# Patient Record
Sex: MALE | URBAN - METROPOLITAN AREA
[De-identification: names, ages, dates, MRNs, and addresses within clinical notes are randomized per-mention and may not be internally consistent; named-entity substitution may affect disease eponyms.]

---

## 2017-02-09 ENCOUNTER — APPOINTMENT (OUTPATIENT)
Age: 72
Setting detail: DERMATOLOGY
End: 2017-02-10

## 2017-02-09 DIAGNOSIS — L57.8 OTHER SKIN CHANGES DUE TO CHRONIC EXPOSURE TO NONIONIZING RADIATION: ICD-10-CM

## 2017-02-09 DIAGNOSIS — B37.2 CANDIDIASIS OF SKIN AND NAIL: ICD-10-CM

## 2017-02-09 DIAGNOSIS — Z85.828 PERSONAL HISTORY OF OTHER MALIGNANT NEOPLASM OF SKIN: ICD-10-CM

## 2017-02-09 DIAGNOSIS — L82.1 OTHER SEBORRHEIC KERATOSIS: ICD-10-CM

## 2017-02-09 DIAGNOSIS — D22 MELANOCYTIC NEVI: ICD-10-CM

## 2017-02-09 DIAGNOSIS — Z71.89 OTHER SPECIFIED COUNSELING: ICD-10-CM

## 2017-02-09 DIAGNOSIS — L57.0 ACTINIC KERATOSIS: ICD-10-CM

## 2017-02-09 PROBLEM — D22.5 MELANOCYTIC NEVI OF TRUNK: Status: ACTIVE | Noted: 2017-02-09

## 2017-02-09 PROBLEM — D22.4 MELANOCYTIC NEVI OF SCALP AND NECK: Status: ACTIVE | Noted: 2017-02-09

## 2017-02-09 PROCEDURE — 17003 DESTRUCT PREMALG LES 2-14: CPT

## 2017-02-09 PROCEDURE — OTHER REASSURANCE: OTHER

## 2017-02-09 PROCEDURE — 17000 DESTRUCT PREMALG LESION: CPT

## 2017-02-09 PROCEDURE — OTHER OBSERVATION: OTHER

## 2017-02-09 PROCEDURE — OTHER LIQUID NITROGEN: OTHER

## 2017-02-09 PROCEDURE — OTHER COUNSELING: OTHER

## 2017-02-09 PROCEDURE — 99213 OFFICE O/P EST LOW 20 MIN: CPT | Mod: 25

## 2017-02-09 PROCEDURE — OTHER MIPS QUALITY: OTHER

## 2017-02-09 ASSESSMENT — LOCATION SIMPLE DESCRIPTION DERM
LOCATION SIMPLE: LEFT FOREHEAD
LOCATION SIMPLE: RIGHT LIP
LOCATION SIMPLE: LEFT CHEEK
LOCATION SIMPLE: ABDOMEN
LOCATION SIMPLE: RIGHT ANTERIOR NECK
LOCATION SIMPLE: RIGHT FOREARM
LOCATION SIMPLE: RIGHT EAR
LOCATION SIMPLE: RIGHT FOREHEAD
LOCATION SIMPLE: LEFT FOREARM
LOCATION SIMPLE: LEFT LIP
LOCATION SIMPLE: NOSE

## 2017-02-09 ASSESSMENT — LOCATION DETAILED DESCRIPTION DERM
LOCATION DETAILED: LEFT DISTAL DORSAL FOREARM
LOCATION DETAILED: RIGHT INFERIOR VERMILION LIP
LOCATION DETAILED: LEFT SUPERIOR LATERAL MALAR CHEEK
LOCATION DETAILED: LEFT INFERIOR VERMILION LIP
LOCATION DETAILED: NASAL ROOT
LOCATION DETAILED: LEFT INFERIOR LATERAL FOREHEAD
LOCATION DETAILED: RIGHT DISTAL DORSAL FOREARM
LOCATION DETAILED: RIGHT LATERAL FOREHEAD
LOCATION DETAILED: EPIGASTRIC SKIN
LOCATION DETAILED: RIGHT SUPERIOR HELIX
LOCATION DETAILED: RIGHT CLAVICULAR NECK

## 2017-02-09 ASSESSMENT — LOCATION ZONE DERM
LOCATION ZONE: TRUNK
LOCATION ZONE: ARM
LOCATION ZONE: LIP
LOCATION ZONE: NECK
LOCATION ZONE: FACE
LOCATION ZONE: NOSE
LOCATION ZONE: EAR

## 2017-02-09 NOTE — PROCEDURE: MIPS QUALITY
Quality 265: Biopsy Follow-Up: Biopsy results reviewed, communicated, tracked, and documented
Quality 155: Falls Plan Of Care: Plan of Care not Documented, Reason not Otherwise Specified
Quality 110: Preventive Care And Screening: Influenza Immunization: Influenza Immunization Administered during Influenza season
Quality 154 Part A: Falls: Risk Assessment (Should Be Reported With Measure 155.): Falls risk assessment completed and documented in the past 12 months.
Quality 431: Preventive Care And Screening: Unhealthy Alcohol Use - Screening: Patient screened for unhealthy alcohol use using a single question and scores less than 2 times per year
Quality 47: Advance Care Plan: Advance care planning not documented, reason not otherwise specified.
Quality 226: Preventive Care And Screening: Tobacco Use: Screening And Cessation Intervention: Patient screened for tobacco and is an ex-smoker
Detail Level: Detailed
Quality 131: Pain Assessment And Follow-Up: Pain assessment using a standardized tool is documented as negative, no follow-up plan required
Quality 111:Pneumonia Vaccination Status For Older Adults: Pneumococcal Vaccination not Administered or Previously Received, Reason not Otherwise Specified
Quality 154 Part B: Falls: Risk Screening (Should Be Reported With Measure 155.): Patient screened for future fall risk; documentation of no falls in the past year or only one fall without injury in the past year
Quality 130: Documentation Of Current Medications In The Medical Record: Current Medications Documented

## 2017-02-09 NOTE — PROCEDURE: LIQUID NITROGEN
Detail Level: Detailed
Duration Of Freeze Thaw-Cycle (Seconds): 4
Post-Care Instructions: I reviewed with the patient in detail post-care instructions. Patient is to wear sunprotection, and avoid picking at any of the treated lesions. Pt may apply Vaseline to crusted or scabbing areas.
Number Of Freeze-Thaw Cycles: 1 freeze-thaw cycle
Consent: The patient's consent was obtained including but not limited to risks of crusting, scabbing, blistering, scarring, darker or lighter pigmentary change, recurrence, incomplete removal and infection.
Render Post-Care Instructions In Note?: yes

## 2017-08-15 ENCOUNTER — HOSPITAL ENCOUNTER (INPATIENT)
Age: 72
LOS: 3 days | Discharge: HOME | DRG: 417 | End: 2017-08-18
Attending: EMERGENCY MEDICINE | Admitting: INTERNAL MEDICINE
Payer: MEDICARE

## 2017-08-15 ENCOUNTER — APPOINTMENT (OUTPATIENT)
Dept: GENERAL RADIOLOGY | Age: 72
DRG: 417 | End: 2017-08-15
Payer: MEDICARE

## 2017-08-15 ENCOUNTER — APPOINTMENT (OUTPATIENT)
Dept: CT IMAGING | Age: 72
DRG: 417 | End: 2017-08-15
Payer: MEDICARE

## 2017-08-15 ENCOUNTER — APPOINTMENT (OUTPATIENT)
Dept: ULTRASOUND IMAGING | Age: 72
DRG: 417 | End: 2017-08-15
Payer: MEDICARE

## 2017-08-15 DIAGNOSIS — R09.02 HYPOXIA: ICD-10-CM

## 2017-08-15 DIAGNOSIS — R10.84 ABDOMINAL PAIN, GENERALIZED: ICD-10-CM

## 2017-08-15 DIAGNOSIS — R63.0 ANOREXIA: ICD-10-CM

## 2017-08-15 DIAGNOSIS — K85.90 ACUTE PANCREATITIS, UNSPECIFIED COMPLICATION STATUS, UNSPECIFIED PANCREATITIS TYPE: Primary | ICD-10-CM

## 2017-08-15 DIAGNOSIS — J44.9 CHRONIC OBSTRUCTIVE PULMONARY DISEASE, UNSPECIFIED COPD TYPE (HCC): ICD-10-CM

## 2017-08-15 PROBLEM — Z98.890 S/P AAA REPAIR: Status: ACTIVE | Noted: 2017-08-15

## 2017-08-15 PROBLEM — J44.1 COPD EXACERBATION (HCC): Status: ACTIVE | Noted: 2017-08-15

## 2017-08-15 PROBLEM — Z86.79 S/P AAA REPAIR: Status: ACTIVE | Noted: 2017-08-15

## 2017-08-15 PROBLEM — E03.9 ACQUIRED HYPOTHYROIDISM: Status: ACTIVE | Noted: 2017-08-15

## 2017-08-15 PROBLEM — I25.10 CAD (CORONARY ARTERY DISEASE): Status: ACTIVE | Noted: 2017-08-15

## 2017-08-15 PROBLEM — N39.0 UTI (URINARY TRACT INFECTION): Status: ACTIVE | Noted: 2017-08-15

## 2017-08-15 LAB
ALBUMIN SERPL BCP-MCNC: 3.6 G/DL (ref 3.5–5)
ALBUMIN/GLOB SERPL: 0.8 {RATIO} (ref 1.1–2.2)
ALP SERPL-CCNC: 131 U/L (ref 45–117)
ALT SERPL-CCNC: 67 U/L (ref 12–78)
ANION GAP BLD CALC-SCNC: 9 MMOL/L (ref 5–15)
APPEARANCE UR: CLEAR
AST SERPL W P-5'-P-CCNC: 57 U/L (ref 15–37)
BACTERIA URNS QL MICRO: NEGATIVE /HPF
BASOPHILS # BLD AUTO: 0.1 K/UL (ref 0–0.1)
BASOPHILS # BLD: 1 % (ref 0–1)
BILIRUB SERPL-MCNC: 0.5 MG/DL (ref 0.2–1)
BILIRUB UR QL CFM: POSITIVE
BUN SERPL-MCNC: 24 MG/DL (ref 6–20)
BUN/CREAT SERPL: 22 (ref 12–20)
CALCIUM SERPL-MCNC: 8.8 MG/DL (ref 8.5–10.1)
CHLORIDE SERPL-SCNC: 102 MMOL/L (ref 97–108)
CO2 SERPL-SCNC: 26 MMOL/L (ref 21–32)
COLOR UR: ABNORMAL
CREAT SERPL-MCNC: 1.11 MG/DL (ref 0.7–1.3)
EOSINOPHIL # BLD: 0.6 K/UL (ref 0–0.4)
EOSINOPHIL NFR BLD: 6 % (ref 0–7)
EPITH CASTS URNS QL MICRO: ABNORMAL /LPF
ERYTHROCYTE [DISTWIDTH] IN BLOOD BY AUTOMATED COUNT: 13 % (ref 11.5–14.5)
GLOBULIN SER CALC-MCNC: 4.5 G/DL (ref 2–4)
GLUCOSE SERPL-MCNC: 197 MG/DL (ref 65–100)
GLUCOSE UR STRIP.AUTO-MCNC: NEGATIVE MG/DL
HCT VFR BLD AUTO: 38.9 % (ref 36.6–50.3)
HGB BLD-MCNC: 13.6 G/DL (ref 12.1–17)
HGB UR QL STRIP: NEGATIVE
HYALINE CASTS URNS QL MICRO: ABNORMAL /LPF (ref 0–5)
KETONES UR QL STRIP.AUTO: 80 MG/DL
LEUKOCYTE ESTERASE UR QL STRIP.AUTO: ABNORMAL
LIPASE SERPL-CCNC: >3000 U/L (ref 73–393)
LYMPHOCYTES # BLD AUTO: 12 % (ref 12–49)
LYMPHOCYTES # BLD: 1.3 K/UL (ref 0.8–3.5)
MCH RBC QN AUTO: 35 PG (ref 26–34)
MCHC RBC AUTO-ENTMCNC: 35 G/DL (ref 30–36.5)
MCV RBC AUTO: 100 FL (ref 80–99)
MONOCYTES # BLD: 0.7 K/UL (ref 0–1)
MONOCYTES NFR BLD AUTO: 6 % (ref 5–13)
MUCOUS THREADS URNS QL MICRO: ABNORMAL /LPF
NEUTS SEG # BLD: 8.1 K/UL (ref 1.8–8)
NEUTS SEG NFR BLD AUTO: 75 % (ref 32–75)
NITRITE UR QL STRIP.AUTO: NEGATIVE
PH UR STRIP: 5.5 [PH] (ref 5–8)
PLATELET # BLD AUTO: 150 K/UL (ref 150–400)
POTASSIUM SERPL-SCNC: 3.6 MMOL/L (ref 3.5–5.1)
PROT SERPL-MCNC: 8.1 G/DL (ref 6.4–8.2)
PROT UR STRIP-MCNC: 30 MG/DL
RBC # BLD AUTO: 3.89 M/UL (ref 4.1–5.7)
RBC #/AREA URNS HPF: ABNORMAL /HPF (ref 0–5)
SODIUM SERPL-SCNC: 137 MMOL/L (ref 136–145)
SP GR UR REFRACTOMETRY: 1.03 (ref 1–1.03)
UA: UC IF INDICATED,UAUC: ABNORMAL
UROBILINOGEN UR QL STRIP.AUTO: 0.2 EU/DL (ref 0.2–1)
WBC # BLD AUTO: 10.7 K/UL (ref 4.1–11.1)
WBC URNS QL MICRO: ABNORMAL /HPF (ref 0–4)

## 2017-08-15 PROCEDURE — 74011000250 HC RX REV CODE- 250: Performed by: PHYSICIAN ASSISTANT

## 2017-08-15 PROCEDURE — 74177 CT ABD & PELVIS W/CONTRAST: CPT

## 2017-08-15 PROCEDURE — 77030029684 HC NEB SM VOL KT MONA -A

## 2017-08-15 PROCEDURE — 74011636320 HC RX REV CODE- 636/320: Performed by: PHYSICIAN ASSISTANT

## 2017-08-15 PROCEDURE — 74011250636 HC RX REV CODE- 250/636

## 2017-08-15 PROCEDURE — 74011250636 HC RX REV CODE- 250/636: Performed by: INTERNAL MEDICINE

## 2017-08-15 PROCEDURE — 87186 SC STD MICRODIL/AGAR DIL: CPT | Performed by: EMERGENCY MEDICINE

## 2017-08-15 PROCEDURE — 85025 COMPLETE CBC W/AUTO DIFF WBC: CPT | Performed by: EMERGENCY MEDICINE

## 2017-08-15 PROCEDURE — 94640 AIRWAY INHALATION TREATMENT: CPT

## 2017-08-15 PROCEDURE — 74011250636 HC RX REV CODE- 250/636: Performed by: PHYSICIAN ASSISTANT

## 2017-08-15 PROCEDURE — 96361 HYDRATE IV INFUSION ADD-ON: CPT

## 2017-08-15 PROCEDURE — 96376 TX/PRO/DX INJ SAME DRUG ADON: CPT

## 2017-08-15 PROCEDURE — 87077 CULTURE AEROBIC IDENTIFY: CPT | Performed by: EMERGENCY MEDICINE

## 2017-08-15 PROCEDURE — 81001 URINALYSIS AUTO W/SCOPE: CPT | Performed by: EMERGENCY MEDICINE

## 2017-08-15 PROCEDURE — 74011636320 HC RX REV CODE- 636/320: Performed by: EMERGENCY MEDICINE

## 2017-08-15 PROCEDURE — 96374 THER/PROPH/DIAG INJ IV PUSH: CPT

## 2017-08-15 PROCEDURE — 74011000250 HC RX REV CODE- 250: Performed by: INTERNAL MEDICINE

## 2017-08-15 PROCEDURE — 87086 URINE CULTURE/COLONY COUNT: CPT | Performed by: EMERGENCY MEDICINE

## 2017-08-15 PROCEDURE — 74011250636 HC RX REV CODE- 250/636: Performed by: EMERGENCY MEDICINE

## 2017-08-15 PROCEDURE — 99285 EMERGENCY DEPT VISIT HI MDM: CPT

## 2017-08-15 PROCEDURE — 96375 TX/PRO/DX INJ NEW DRUG ADDON: CPT

## 2017-08-15 PROCEDURE — 71020 XR CHEST PA LAT: CPT

## 2017-08-15 PROCEDURE — 65660000000 HC RM CCU STEPDOWN

## 2017-08-15 PROCEDURE — 76705 ECHO EXAM OF ABDOMEN: CPT

## 2017-08-15 PROCEDURE — 83690 ASSAY OF LIPASE: CPT | Performed by: PHYSICIAN ASSISTANT

## 2017-08-15 PROCEDURE — 80053 COMPREHEN METABOLIC PANEL: CPT | Performed by: EMERGENCY MEDICINE

## 2017-08-15 PROCEDURE — 36415 COLL VENOUS BLD VENIPUNCTURE: CPT | Performed by: EMERGENCY MEDICINE

## 2017-08-15 RX ORDER — SODIUM CHLORIDE 0.9 % (FLUSH) 0.9 %
5-10 SYRINGE (ML) INJECTION AS NEEDED
Status: DISCONTINUED | OUTPATIENT
Start: 2017-08-15 | End: 2017-08-18 | Stop reason: HOSPADM

## 2017-08-15 RX ORDER — ONDANSETRON 2 MG/ML
4 INJECTION INTRAMUSCULAR; INTRAVENOUS
Status: DISCONTINUED | OUTPATIENT
Start: 2017-08-15 | End: 2017-08-18 | Stop reason: HOSPADM

## 2017-08-15 RX ORDER — ONDANSETRON 2 MG/ML
INJECTION INTRAMUSCULAR; INTRAVENOUS
Status: COMPLETED
Start: 2017-08-15 | End: 2017-08-15

## 2017-08-15 RX ORDER — GUAIFENESIN 100 MG/5ML
81 LIQUID (ML) ORAL DAILY
Status: DISCONTINUED | OUTPATIENT
Start: 2017-08-16 | End: 2017-08-18 | Stop reason: HOSPADM

## 2017-08-15 RX ORDER — MORPHINE SULFATE 2 MG/ML
2 INJECTION, SOLUTION INTRAMUSCULAR; INTRAVENOUS
Status: COMPLETED | OUTPATIENT
Start: 2017-08-15 | End: 2017-08-15

## 2017-08-15 RX ORDER — SODIUM CHLORIDE 0.9 % (FLUSH) 0.9 %
5-10 SYRINGE (ML) INJECTION EVERY 8 HOURS
Status: DISCONTINUED | OUTPATIENT
Start: 2017-08-15 | End: 2017-08-18 | Stop reason: HOSPADM

## 2017-08-15 RX ORDER — ONDANSETRON 2 MG/ML
4 INJECTION INTRAMUSCULAR; INTRAVENOUS
Status: COMPLETED | OUTPATIENT
Start: 2017-08-15 | End: 2017-08-15

## 2017-08-15 RX ORDER — TAMSULOSIN HYDROCHLORIDE 0.4 MG/1
0.4 CAPSULE ORAL DAILY
Status: DISCONTINUED | OUTPATIENT
Start: 2017-08-16 | End: 2017-08-18 | Stop reason: HOSPADM

## 2017-08-15 RX ORDER — CLOPIDOGREL BISULFATE 75 MG/1
75 TABLET ORAL DAILY
Status: ON HOLD | COMMUNITY
End: 2017-10-05

## 2017-08-15 RX ORDER — FLUTICASONE PROPIONATE AND SALMETEROL 250; 50 UG/1; UG/1
1 POWDER RESPIRATORY (INHALATION) EVERY 12 HOURS
COMMUNITY

## 2017-08-15 RX ORDER — LEVOTHYROXINE SODIUM 75 UG/1
75 TABLET ORAL
Status: DISCONTINUED | OUTPATIENT
Start: 2017-08-16 | End: 2017-08-18 | Stop reason: HOSPADM

## 2017-08-15 RX ORDER — PHENYTOIN SODIUM 100 MG/1
100 CAPSULE, EXTENDED RELEASE ORAL 2 TIMES DAILY
Status: DISCONTINUED | OUTPATIENT
Start: 2017-08-16 | End: 2017-08-16

## 2017-08-15 RX ORDER — IPRATROPIUM BROMIDE AND ALBUTEROL SULFATE 2.5; .5 MG/3ML; MG/3ML
3 SOLUTION RESPIRATORY (INHALATION)
Status: COMPLETED | OUTPATIENT
Start: 2017-08-15 | End: 2017-08-15

## 2017-08-15 RX ORDER — ACETAMINOPHEN 325 MG/1
650 TABLET ORAL
Status: DISCONTINUED | OUTPATIENT
Start: 2017-08-15 | End: 2017-08-18 | Stop reason: HOSPADM

## 2017-08-15 RX ORDER — LEVOTHYROXINE SODIUM 75 UG/1
75 TABLET ORAL
COMMUNITY

## 2017-08-15 RX ORDER — SODIUM CHLORIDE 9 MG/ML
75 INJECTION, SOLUTION INTRAVENOUS CONTINUOUS
Status: DISCONTINUED | OUTPATIENT
Start: 2017-08-15 | End: 2017-08-18 | Stop reason: HOSPADM

## 2017-08-15 RX ORDER — SODIUM CHLORIDE 9 MG/ML
50 INJECTION, SOLUTION INTRAVENOUS
Status: COMPLETED | OUTPATIENT
Start: 2017-08-15 | End: 2017-08-15

## 2017-08-15 RX ORDER — SODIUM CHLORIDE 0.9 % (FLUSH) 0.9 %
10 SYRINGE (ML) INJECTION
Status: COMPLETED | OUTPATIENT
Start: 2017-08-15 | End: 2017-08-15

## 2017-08-15 RX ORDER — LEVOFLOXACIN 5 MG/ML
500 INJECTION, SOLUTION INTRAVENOUS EVERY 24 HOURS
Status: DISCONTINUED | OUTPATIENT
Start: 2017-08-15 | End: 2017-08-16

## 2017-08-15 RX ORDER — TAMSULOSIN HYDROCHLORIDE 0.4 MG/1
0.4 CAPSULE ORAL DAILY
COMMUNITY

## 2017-08-15 RX ORDER — MORPHINE SULFATE 2 MG/ML
2 INJECTION, SOLUTION INTRAMUSCULAR; INTRAVENOUS
Status: DISCONTINUED | OUTPATIENT
Start: 2017-08-15 | End: 2017-08-18 | Stop reason: HOSPADM

## 2017-08-15 RX ORDER — CLOPIDOGREL BISULFATE 75 MG/1
75 TABLET ORAL DAILY
Status: DISCONTINUED | OUTPATIENT
Start: 2017-08-16 | End: 2017-08-18 | Stop reason: HOSPADM

## 2017-08-15 RX ORDER — IPRATROPIUM BROMIDE AND ALBUTEROL SULFATE 2.5; .5 MG/3ML; MG/3ML
3 SOLUTION RESPIRATORY (INHALATION)
Status: DISCONTINUED | OUTPATIENT
Start: 2017-08-15 | End: 2017-08-18 | Stop reason: HOSPADM

## 2017-08-15 RX ORDER — ENOXAPARIN SODIUM 100 MG/ML
40 INJECTION SUBCUTANEOUS EVERY 24 HOURS
Status: DISCONTINUED | OUTPATIENT
Start: 2017-08-16 | End: 2017-08-18 | Stop reason: HOSPADM

## 2017-08-15 RX ORDER — FLUTICASONE FUROATE AND VILANTEROL 100; 25 UG/1; UG/1
1 POWDER RESPIRATORY (INHALATION) DAILY
Status: DISCONTINUED | OUTPATIENT
Start: 2017-08-16 | End: 2017-08-18 | Stop reason: HOSPADM

## 2017-08-15 RX ADMIN — SODIUM CHLORIDE 50 ML/HR: 900 INJECTION, SOLUTION INTRAVENOUS at 14:46

## 2017-08-15 RX ADMIN — Medication 10 ML: at 14:56

## 2017-08-15 RX ADMIN — IPRATROPIUM BROMIDE AND ALBUTEROL SULFATE 3 ML: .5; 3 SOLUTION RESPIRATORY (INHALATION) at 17:39

## 2017-08-15 RX ADMIN — Medication 10 ML: at 14:46

## 2017-08-15 RX ADMIN — Medication 10 ML: at 23:59

## 2017-08-15 RX ADMIN — ONDANSETRON 4 MG: 2 INJECTION INTRAMUSCULAR; INTRAVENOUS at 19:45

## 2017-08-15 RX ADMIN — FAMOTIDINE 20 MG: 10 INJECTION, SOLUTION INTRAVENOUS at 23:07

## 2017-08-15 RX ADMIN — ONDANSETRON 4 MG: 2 INJECTION INTRAMUSCULAR; INTRAVENOUS at 14:55

## 2017-08-15 RX ADMIN — SODIUM CHLORIDE 75 ML/HR: 900 INJECTION, SOLUTION INTRAVENOUS at 23:07

## 2017-08-15 RX ADMIN — METHYLPREDNISOLONE SODIUM SUCCINATE 60 MG: 125 INJECTION, POWDER, FOR SOLUTION INTRAMUSCULAR; INTRAVENOUS at 23:07

## 2017-08-15 RX ADMIN — SODIUM CHLORIDE 1000 ML: 900 INJECTION, SOLUTION INTRAVENOUS at 14:55

## 2017-08-15 RX ADMIN — DIATRIZOATE MEGLUMINE AND DIATRIZOATE SODIUM 30 ML: 600; 100 SOLUTION ORAL; RECTAL at 14:55

## 2017-08-15 RX ADMIN — LEVOFLOXACIN 500 MG: 5 INJECTION, SOLUTION INTRAVENOUS at 23:07

## 2017-08-15 RX ADMIN — MORPHINE SULFATE 2 MG: 2 INJECTION, SOLUTION INTRAMUSCULAR; INTRAVENOUS at 14:55

## 2017-08-15 RX ADMIN — IOPAMIDOL 100 ML: 755 INJECTION, SOLUTION INTRAVENOUS at 14:46

## 2017-08-15 NOTE — IP AVS SNAPSHOT
[address] [facility] 
[phone] Patient: Alvarado Cotto MRN: [id number] Current Discharge Medication List  
  
START taking these medications Dose & Instructions Dispensing Information Comments Morning Noon Evening Bedtime  
 levoFLOXacin 750 mg tablet Commonly known as:  Angie Ground Your last dose was: Your next dose is:    
   
   
 Dose:  750 mg Take 1 Tab by mouth daily. Quantity:  7 Tab Refills:  0  
     
   
   
   
  
 ondansetron hcl 4 mg tablet Commonly known as:  ZOFRAN (AS HYDROCHLORIDE) Your last dose was: Your next dose is:    
   
   
 Dose:  4 mg Take 1 Tab by mouth every eight (8) hours as needed for Nausea. Quantity:  10 Tab Refills:  0  
     
   
   
   
  
 oxyCODONE-acetaminophen 5-325 mg per tablet Commonly known as:  PERCOCET Your last dose was: Your next dose is:    
   
   
 Dose:  1 Tab Take 1 Tab by mouth every six (6) hours as needed. Max Daily Amount: 4 Tabs. Quantity:  20 Tab Refills:  0  
     
   
   
   
  
 predniSONE 20 mg tablet Commonly known as:  Marry Escobedo Your last dose was: Your next dose is:    
   
   
 Dose:  40 mg Take 2 Tabs by mouth daily (with breakfast) for 5 days. 1 tab daily for 5 days Quantity:  15 Tab Refills:  0 CONTINUE these medications which have NOT CHANGED Dose & Instructions Dispensing Information Comments Morning Noon Evening Bedtime ADVAIR DISKUS 250-50 mcg/dose diskus inhaler Generic drug:  fluticasone-salmeterol Your last dose was: Your next dose is:    
   
   
 Dose:  1 Puff Take 1 Puff by inhalation every twelve (12) hours. Refills:  0  
     
   
   
   
  
 aspirin 81 mg tablet Your last dose was: Your next dose is:    
   
   
 Dose:  81 mg Take 81 mg by mouth daily. Refills:  0 CENTRUM SILVER Tab tablet Generic drug:  multivitamins-minerals-lutein Your last dose was: Your next dose is:    
   
   
 Dose:  1 Tab Take 1 Tab by mouth daily. Refills:  0  
     
   
   
   
  
 clopidogrel 75 mg Tab Commonly known as:  PLAVIX Your last dose was: Your next dose is:    
   
   
 Dose:  75 mg Take 75 mg by mouth daily. Refills:  0  
     
   
   
   
  
 levothyroxine 75 mcg tablet Commonly known as:  SYNTHROID Your last dose was: Your next dose is:    
   
   
 Dose:  75 mcg Take 75 mcg by mouth Daily (before breakfast). Refills:  0 phenytoin  mg ER capsule Commonly known as:  DILANTIN ER Your last dose was: Your next dose is:    
   
   
 Dose:  100 mg Take 100 mg by mouth two (2) times a day. 1 in morning, 2 at bedtime  Indications: Takes 100 mg in AM, and 200 mg in PM  
 Refills:  0 SPIRIVA WITH HANDIHALER 18 mcg inhalation capsule Generic drug:  tiotropium Your last dose was: Your next dose is:    
   
   
 Dose:  1 Cap Take 1 Cap by inhalation daily. Refills:  0  
     
   
   
   
  
 tamsulosin 0.4 mg capsule Commonly known as:  FLOMAX Your last dose was: Your next dose is:    
   
   
 Dose:  0.4 mg Take 0.4 mg by mouth daily. Refills:  0  
     
   
   
   
  
 TRIPLE OMEGA 3-6-9 PO Your last dose was: Your next dose is:    
   
   
 Dose:  1 Tab Take 1 Tab by mouth daily. Refills:  0 Where to Get Your Medications Information on where to get these meds will be given to you by the nurse or doctor. ! Ask your nurse or doctor about these medications  
  levoFLOXacin 750 mg tablet  
 ondansetron hcl 4 mg tablet  
 oxyCODONE-acetaminophen 5-325 mg per tablet  
 predniSONE 20 mg tablet

## 2017-08-15 NOTE — ED PROVIDER NOTES
HPI Comments: Sharath Najera is a 74 y.o. male with PMHx significant for s/p abdominal aneurysm repair and stent placement, and CAD, who presents ambulatory to ED [facility] ED with cc of constant worsening periumbilical abdominal pain for 4 days with associated appetite loss and nausea. Pt also complains of mid back pain for one week. Per the pt's wife, pt has had intermittent abdominal pain throughout the year, and had recently gone to the ED at [facility] yesterday but had stayed there for 8 hours without being seen. She also notes that his PCP had done blood work that had resulted yesterday and shown his WBC to be high and had him scheduled for an US of his gallbladder. He denies having had any recent abdominal surgeries and still has his appendix and gallbladder. His wife notes that he sees his cardiologist yearly. Pt specifically denies any vomiting, diarrhea, or fever. Ame Albarado MD  Cardiologist: Mirian Holbrook MD  Urologist: Papi Nixon MD    Social Hx: - smoking (former); - EtOH; - illicit drug use    There are no other complains, changes, or physical findings at this time. The history is provided by the patient and the spouse. Past Medical History:   Diagnosis Date    CAD (coronary artery disease)     Other ill-defined conditions     marce kc, MI (1995), aortic aneurysm       Past Surgical History:   Procedure Laterality Date    ABDOMEN SURGERY PROC UNLISTED      hernia     CARDIAC SURG PROCEDURE UNLIST      stents placed    HX HEENT      cateracts removed, lens implants    HX OTHER SURGICAL      abdominal anuersym         Family History:   Problem Relation Age of Onset    Elevated Lipids Mother     Heart Disease Mother     Asthma Brother     Cancer Brother     Diabetes Brother        Social History     Social History    Marital status:      Spouse name: N/A    Number of children: N/A    Years of education: N/A     Occupational History    Not on file. Social History Main Topics    Smoking status: Former Smoker     Packs/day: 0.50     Quit date: 8/15/2015    Smokeless tobacco: Never Used      Comment: e-cigarettes    Alcohol use No    Drug use: No    Sexual activity: Not on file     Other Topics Concern    Not on file     Social History Narrative         ALLERGIES: Review of patient's allergies indicates no known allergies. Review of Systems   Constitutional: Positive for appetite change (loss). Negative for chills and fever. HENT: Negative for congestion, rhinorrhea and sore throat. Respiratory: Negative for cough and shortness of breath. Cardiovascular: Negative for chest pain and palpitations. Gastrointestinal: Positive for abdominal pain (periumbilical) and nausea. Negative for diarrhea and vomiting. Genitourinary: Negative for dysuria and hematuria. Musculoskeletal: Positive for back pain. Negative for neck pain and neck stiffness. Skin: Negative for rash and wound. Neurological: Negative for dizziness and headaches. Psychiatric/Behavioral: Negative for agitation and confusion. Patient Vitals for the past 12 hrs:   Temp Pulse Resp BP SpO2   08/15/17 1800 - 92 21 110/85 93 %   08/15/17 1746 - 83 17 134/70 93 %   08/15/17 1730 - 88 20 120/65 92 %   08/15/17 1715 - 93 20 122/85 90 %   08/15/17 1703 - 83 21 118/70 90 %   08/15/17 1700 - 86 21 - 90 %   08/15/17 1600 - 92 25 104/65 (!) 86 %   08/15/17 1548 - - - - 91 %   08/15/17 1545 - 93 20 125/63 -   08/15/17 1533 - - - - 92 %   08/15/17 1530 - 86 20 123/72 -   08/15/17 1515 - 77 16 136/74 91 %   08/15/17 1500 - 80 16 (!) 112/91 93 %   08/15/17 1339 - 80 18 98/71 96 %   08/15/17 1235 97.7 °F (36.5 °C) 98 16 125/65 97 %       Physical Exam   Constitutional: He is oriented to person, place, and time. He appears well-developed and well-nourished. No distress. Pt is a thin elderly male in NAD. HENT:   Head: Normocephalic and atraumatic.    Nose: Nose normal.   Mouth/Throat: Oropharynx is clear and moist. Mucous membranes are dry. No oropharyngeal exudate. Eyes: Conjunctivae and EOM are normal. Right eye exhibits no discharge. Left eye exhibits no discharge. No scleral icterus. Neck: Normal range of motion. Neck supple. No JVD present. No tracheal deviation present. No thyromegaly present. Cardiovascular: Normal rate, regular rhythm and normal heart sounds. Pulmonary/Chest: Effort normal and breath sounds normal. No respiratory distress. He has no wheezes. Abdominal: Soft. There is tenderness in the periumbilical area. There is negative Arthur's sign. Musculoskeletal: Normal range of motion. He exhibits no edema. Lymphadenopathy:     He has no cervical adenopathy. Neurological: He is alert and oriented to person, place, and time. He exhibits normal muscle tone. Coordination normal.   Skin: Skin is warm and dry. He is not diaphoretic. Psychiatric: He has a normal mood and affect. His behavior is normal. Judgment normal.   Nursing note and vitals reviewed. MDM  Number of Diagnoses or Management Options  Diagnosis management comments: DDx: appendicitis, cholecystitis, diverticulitis, intraabdominal mass, UTI       Amount and/or Complexity of Data Reviewed  Clinical lab tests: ordered and reviewed  Tests in the radiology section of CPT®: ordered and reviewed  Obtain history from someone other than the patient: yes (Wife)  Review and summarize past medical records: yes  Discuss the patient with other providers: yes (Hospitalist)    Patient Progress  Patient progress: stable    ED Course       Procedures    PROGRESS NOTE:  5:22 PM  Pt has been re-evaluated. Spoke with nurse about pt's low spo2 in the low 90s-high 80s, wife states that he does have COPD and does not use breathing treatments at home.     CONSULT NOTE:   5:24 PM  CAMILO Seo spoke with Jimbo Ortega MD,   Specialty: Hospitalist  Discussed pt's hx, disposition, and available diagnostic and imaging results. Reviewed care plans. Consultant will evaluate pt for admission. Written by RACIEL Martinez, as dictated by Marleny Wood. PROGRESS NOTE:  7:44 PM  Pt has been re-evaluated. He is feeling hungry and is requesting something to eat. LABORATORY TESTS:  Recent Results (from the past 12 hour(s))   URINALYSIS W/ REFLEX CULTURE    Collection Time: 08/15/17 12:50 PM   Result Value Ref Range    Color DARK YELLOW      Appearance CLEAR CLEAR      Specific gravity 1.026 1.003 - 1.030      pH (UA) 5.5 5.0 - 8.0      Protein 30 (A) NEG mg/dL    Glucose NEGATIVE  NEG mg/dL    Ketone 80 (A) NEG mg/dL    Blood NEGATIVE  NEG      Urobilinogen 0.2 0.2 - 1.0 EU/dL    Nitrites NEGATIVE  NEG      Leukocyte Esterase SMALL (A) NEG      WBC 10-20 0 - 4 /hpf    RBC 0-5 0 - 5 /hpf    Epithelial cells FEW FEW /lpf    Bacteria NEGATIVE  NEG /hpf    UA:UC IF INDICATED URINE CULTURE ORDERED (A) CNI      Mucus TRACE (A) NEG /lpf    Hyaline cast 0-2 0 - 5 /lpf   BILIRUBIN, CONFIRM    Collection Time: 08/15/17 12:50 PM   Result Value Ref Range    Bilirubin UA, confirm POSITIVE (A) NEG     CBC WITH AUTOMATED DIFF    Collection Time: 08/15/17  1:01 PM   Result Value Ref Range    WBC 10.7 4.1 - 11.1 K/uL    RBC 3.89 (L) 4.10 - 5.70 M/uL    HGB 13.6 12.1 - 17.0 g/dL    HCT 38.9 36.6 - 50.3 %    .0 (H) 80.0 - 99.0 FL    MCH 35.0 (H) 26.0 - 34.0 PG    MCHC 35.0 30.0 - 36.5 g/dL    RDW 13.0 11.5 - 14.5 %    PLATELET 603 813 - 502 K/uL    NEUTROPHILS 75 32 - 75 %    LYMPHOCYTES 12 12 - 49 %    MONOCYTES 6 5 - 13 %    EOSINOPHILS 6 0 - 7 %    BASOPHILS 1 0 - 1 %    ABS. NEUTROPHILS 8.1 (H) 1.8 - 8.0 K/UL    ABS. LYMPHOCYTES 1.3 0.8 - 3.5 K/UL    ABS. MONOCYTES 0.7 0.0 - 1.0 K/UL    ABS. EOSINOPHILS 0.6 (H) 0.0 - 0.4 K/UL    ABS.  BASOPHILS 0.1 0.0 - 0.1 K/UL   METABOLIC PANEL, COMPREHENSIVE    Collection Time: 08/15/17  1:01 PM   Result Value Ref Range    Sodium 137 136 - 145 mmol/L    Potassium 3.6 3.5 - 5.1 mmol/L Chloride 102 97 - 108 mmol/L    CO2 26 21 - 32 mmol/L    Anion gap 9 5 - 15 mmol/L    Glucose 197 (H) 65 - 100 mg/dL    BUN 24 (H) 6 - 20 MG/DL    Creatinine 1.11 0.70 - 1.30 MG/DL    BUN/Creatinine ratio 22 (H) 12 - 20      GFR est AA >60 >60 ml/min/1.73m2    GFR est non-AA >60 >60 ml/min/1.73m2    Calcium 8.8 8.5 - 10.1 MG/DL    Bilirubin, total 0.5 0.2 - 1.0 MG/DL    ALT (SGPT) 67 12 - 78 U/L    AST (SGOT) 57 (H) 15 - 37 U/L    Alk. phosphatase 131 (H) 45 - 117 U/L    Protein, total 8.1 6.4 - 8.2 g/dL    Albumin 3.6 3.5 - 5.0 g/dL    Globulin 4.5 (H) 2.0 - 4.0 g/dL    A-G Ratio 0.8 (L) 1.1 - 2.2     LIPASE    Collection Time: 08/15/17  1:01 PM   Result Value Ref Range    Lipase >3000 (H) 73 - 393 U/L       IMAGING RESULTS:  CT Results  (Last 48 hours)               08/15/17 1619  CT ABD PELV W CONT Final result    Impression:  IMPRESSION:       No definite acute abnormality. Moderate distention of the gallbladder without   inflammation or calcified stone. Nonspecific epithelial thickening of the common   bile duct. Probable pancreatic divisum without any inflammatory changes of the   pancreas. There is also mild pancreatic ductal prominence. Sequelae of abdominal aortic endograft repair without retroperitoneal   hemorrhage. Intact right internal iliac artery aneurysmal dilatation measuring 2   cm. Mild to moderate emphysematous changes in the lungs. Narrative:  INDICATION: Periumbilical abdominal pain radiating to the back with anorexia for   4 days       COMPARISON: None available       TECHNIQUE:    Thin axial images were obtained through the abdomen and pelvis following   intravenous iodinated contrast administration. Coronal and sagittal   reconstructions were generated. Oral contrast was administered. CT dose   reduction was achieved through use of a standardized protocol tailored for this   examination and automatic exposure control for dose modulation.         FINDINGS: LIVER: No mass or biliary dilatation. GALLBLADDER: Mild distention without any acute abnormality or calcified stone. Nonspecific epithelial thickening of the common bile duct. SPLEEN: Unremarkable   PANCREAS: No focal lesion. Mild pancreatic ductal prominence with probable   pancreatic divisum. No peripancreatic inflammation. ADRENALS: Mild nonspecific thickening without focal abnormality   KIDNEYS/URETERS: Normal symmetric nephrograms the few low-density lesions too   small to characterize. No other focal renal abnormality. Bilateral vascular   calcifications. No hydronephrosis or hydroureter. PERITONEUM: No abdominal lymphadenopathy or ascites. Sequelae of abdominal   aortic aneurysm graft repair is seen. No retroperitoneal infiltration or   hemorrhage. COLON: No dilatation or wall thickening. APPENDIX: Unremarkable. SMALL BOWEL: No dilatation or wall thickening. STOMACH: Unremarkable. PELVIS: No pelvic lymphadenopathy or free fluid. Aneurysmal dilatation of the   right internal iliac artery measuring 2 cm. The bladder is unremarkable. BONES: No destructive bone lesion. VISUALIZED THORAX: Mild to moderate emphysematous changes in the lungs with   lingular atelectasis and/or scarring   ADDITIONAL COMMENTS: N/A               CXR Results  (Last 48 hours)               08/15/17 1927  XR CHEST PA LAT Final result    Impression:  IMPRESSION: Diffuse bilateral interstitial lung disease. No evidence of   pneumonia or pleural effusion. Narrative:  INDICATION: Shortness of breath       FINDINGS: PA and lateral views of the chest demonstrate a normal   cardiomediastinal silhouette. There are mild diffuse bilateral reticular lung   opacities. No focal consolidation or pleural effusion is evident. The visualized   osseous structures are unremarkable.                US ABD LTD  INDICATION:  Pancreatitis.     COMPARISON:  CT scan earlier same day.      TECHNIQUE:    Multiple sonographic real time images were obtained of the right upper abdomen.      FINDINGS:    The gallbladder is distended and contains trace sludge. No mobile shadowing gallstones are identified and no gallbladder wall thickening  is seen. The extrahepatic bile duct measures 0.5 cm which is normal in caliber. The visualized portions of the liver are unremarkable. Flow in the portal vein is appropriate towards the liver. The pancreas is obscured by bowel gas. The right kidney measures 10.3 cm and shows no hydronephrosis.        IMPRESSION  IMPRESSION:    Prominent distention of the gallbladder which contains tiny amount of sludge  with no mobile shadowing gallstones identified.     No biliary ductal dilatation.     No right hydronephrosis.                    MEDICATIONS GIVEN:  Medications   sodium chloride (NS) flush 5-10 mL (10 mL IntraVENous Given 8/15/17 1456)   sodium chloride (NS) flush 5-10 mL (10 mL IntraVENous Given 8/15/17 1456)   ondansetron (ZOFRAN) 4 mg/2 mL injection (not administered)   ondansetron (ZOFRAN) injection 4 mg (not administered)   sodium chloride 0.9 % bolus infusion 1,000 mL (1,000 mL IntraVENous New Bag 8/15/17 1455)   ondansetron (ZOFRAN) injection 4 mg (4 mg IntraVENous Given 8/15/17 1455)   morphine injection 2 mg (2 mg IntraVENous Given 8/15/17 1455)   diatrizoate meglumine-d.sodium (MD-GASTROVIEW,GASTROGRAFIN) 66-10 % contrast solution 30 mL (30 mL Oral Given 8/15/17 1455)   0.9% sodium chloride infusion (0 mL/hr IntraVENous IV Completed 8/15/17 1637)   iopamidol (ISOVUE-370) 76 % injection 100 mL (100 mL IntraVENous Given 8/15/17 1446)   sodium chloride (NS) flush 10 mL (10 mL IntraVENous Given 8/15/17 1446)   albuterol-ipratropium (DUO-NEB) 2.5 MG-0.5 MG/3 ML (3 mL Nebulization Given 8/15/17 1135)       IMPRESSION:  1. Acute pancreatitis, unspecified complication status, unspecified pancreatitis type    2. Abdominal pain, generalized    3. Anorexia    4. Hypoxia    5.  Chronic obstructive pulmonary disease, unspecified COPD type ([facility]        PLAN:  1. Admit to Hospitalist    ADMIT NOTE:  4:38 PM  Patient is being admitted to the hospital by Dr. Stefanie Caldwell. The results of their tests and reasons for their admission have been discussed with them and/or available family. They convey agreement and understanding for the need to be admitted and for their admission diagnosis. Consultation has been made with the inpatient physician specialist for hospitalization. ATTESTATION:  This note is prepared by Son Hermosillo, acting as Scribe for [facility] [employer]. CAMILO Angel: The scribe's documentation has been prepared under my direction and personally reviewed by me in its entirety. I confirm that the note above accurately reflects all work, treatment, procedures, and medical decision making performed by me.

## 2017-08-15 NOTE — ED NOTES
Bedside and Verbal shift change report given to [address] [facility] (oncoming nurse) by Brian Watts (offgoing nurse). Report included the following information SBAR, ED Summary, Intake/Output, MAR and Recent Results. Pt on monitor x 3. Call bell in reach. Family at bedside. Pt drinking contrast for CT scan. IVF infusing.

## 2017-08-15 NOTE — IP AVS SNAPSHOT
[address] [facility] 
[phone] Patient: Travis Palacio MRN: [id number] NJO:[birth year] You are allergic to the following No active allergies Recent Documentation Height Weight BMI Smoking Status 1.727 m 60.6 kg 20.31 kg/m2 Former Smoker Emergency Contacts Name Discharge Info Relation Home Work Mobile Alessia Espinosa  Spouse [3] [phone] About your hospitalization You were admitted on:  August 15, 2017 You last received care in the:  [facility] 3 RENAL MEDICINE You were discharged on:  August 18, 2017 Unit phone number:  [phone] Why you were hospitalized Your primary diagnosis was:  Not on File Your diagnoses also included:  Acute Pancreatitis, Copd Exacerbation (Hcc), Uti (Urinary Tract Infection), Cad (Coronary Artery Disease), Acquired Hypothyroidism, S/P Aaa [employer] Providers Seen During Your Hospitalizations Provider Role Specialty Primary office phone Felice Millard DO Attending Provider Emergency Medicine [phone] Tawana Sam MD Attending Provider Hospitalist [phone] Your Primary Care Physician (PCP) Primary Care Physician Office Phone Office Fax Carroll Stephen [phone] Follow-up Information Follow up With Details Comments Contact Info Lita Boxer, MD On 9/6/2017 4;40pm  Please arrive 20min. early [address] [facility] [address] [facility] 
[phone] Darby Marie MD  Please call when you get home to schedule a hospital follow-up [address] [place] [address] [phone] Your Appointments Wednesday September 06, 2017  4:40 PM EDT  
POST OP with Lita Boxer, MD  
[facility] of [facility] Dr. David Neves [facility] ([facility]) [address] [place] [address], [address] [address] [phone] Current Discharge Medication List  
  
START taking these medications Dose & Instructions Dispensing Information Comments Morning Noon Evening Bedtime  
 levoFLOXacin 750 mg tablet Commonly known as:  Roberta Berumen Your last dose was: Your next dose is:    
   
   
 Dose:  750 mg Take 1 Tab by mouth daily. Quantity:  7 Tab Refills:  0  
     
   
   
   
  
 ondansetron hcl 4 mg tablet Commonly known as:  ZOFRAN (AS HYDROCHLORIDE) Your last dose was: Your next dose is:    
   
   
 Dose:  4 mg Take 1 Tab by mouth every eight (8) hours as needed for Nausea. Quantity:  10 Tab Refills:  0  
     
   
   
   
  
 oxyCODONE-acetaminophen 5-325 mg per tablet Commonly known as:  PERCOCET Your last dose was: Your next dose is:    
   
   
 Dose:  1 Tab Take 1 Tab by mouth every six (6) hours as needed. Max Daily Amount: 4 Tabs. Quantity:  20 Tab Refills:  0  
     
   
   
   
  
 predniSONE 20 mg tablet Commonly known as:  Tera Simeon Your last dose was: Your next dose is:    
   
   
 Dose:  40 mg Take 2 Tabs by mouth daily (with breakfast) for 5 days. 1 tab daily for 5 days Quantity:  15 Tab Refills:  0 CONTINUE these medications which have NOT CHANGED Dose & Instructions Dispensing Information Comments Morning Noon Evening Bedtime ADVAIR DISKUS 250-50 mcg/dose diskus inhaler Generic drug:  fluticasone-salmeterol Your last dose was: Your next dose is:    
   
   
 Dose:  1 Puff Take 1 Puff by inhalation every twelve (12) hours. Refills:  0  
     
   
   
   
  
 aspirin 81 mg tablet Your last dose was: Your next dose is:    
   
   
 Dose:  81 mg Take 81 mg by mouth daily. Refills:  0 CENTRUM SILVER Tab tablet Generic drug:  multivitamins-minerals-lutein Your last dose was: Your next dose is:    
   
   
 Dose:  1 Tab Take 1 Tab by mouth daily. Refills:  0  
     
   
   
   
  
 clopidogrel 75 mg Tab Commonly known as:  PLAVIX Your last dose was: Your next dose is:    
   
   
 Dose:  75 mg Take 75 mg by mouth daily. Refills:  0  
     
   
   
   
  
 levothyroxine 75 mcg tablet Commonly known as:  SYNTHROID Your last dose was: Your next dose is:    
   
   
 Dose:  75 mcg Take 75 mcg by mouth Daily (before breakfast). Refills:  0 phenytoin  mg ER capsule Commonly known as:  DILANTIN ER Your last dose was: Your next dose is:    
   
   
 Dose:  100 mg Take 100 mg by mouth two (2) times a day. 1 in morning, 2 at bedtime  Indications: Takes 100 mg in AM, and 200 mg in PM  
 Refills:  0 SPIRIVA WITH HANDIHALER 18 mcg inhalation capsule Generic drug:  tiotropium Your last dose was: Your next dose is:    
   
   
 Dose:  1 Cap Take 1 Cap by inhalation daily. Refills:  0  
     
   
   
   
  
 tamsulosin 0.4 mg capsule Commonly known as:  FLOMAX Your last dose was: Your next dose is:    
   
   
 Dose:  0.4 mg Take 0.4 mg by mouth daily. Refills:  0  
     
   
   
   
  
 TRIPLE OMEGA 3-6-9 PO Your last dose was: Your next dose is:    
   
   
 Dose:  1 Tab Take 1 Tab by mouth daily. Refills:  0 Where to Get Your Medications Information on where to get these meds will be given to you by the nurse or doctor. ! Ask your nurse or doctor about these medications  
  levoFLOXacin 750 mg tablet  
 ondansetron hcl 4 mg tablet  
 oxyCODONE-acetaminophen 5-325 mg per tablet  
 predniSONE 20 mg tablet Discharge Instructions Discharge Instructions:  Laparoscopic Cholecystectomy (Gallbladder Removal)  Dr. Blayne Solorzano Call for appointment for follow up in 3 weeks [phone] Activity: 
 
Walk regularly. No lifting more than 10 -15 pounds for 4 weeks. Light aerobic activity is okay when you feel up to it. You may resume driving in three days unless still requiring narcotics for pain. Work: 
 
You may return to work in 1 or 2 weeks to light activity. No lifting more than 10 pounds for four weeks. Diet: 
 
You may resume normal diet after 24 hours. Fatty foods may still cause some stomach upset. Wound Care: You have a special dressing called Dermabond. It is okay to shower and let the water run over the incisions but do not scrub the area or soak in a tub. If you have a small amount of drainage you may place a dry bandage over the wound and change it daily. If you experience a lot of drainage, develop redness around the wound, or a fever over 101 F occurs please call the office. Medications: 
 
Resume home medications as indicated on the Medical Reconciliation form. Aspirin and Coumadin can be restarted immediately if you were taking them preoperatively. If taking Plavix do not restart it until post operative day 2. Pain medications:  Non steroidal antiinflammatories seem to work best for post surgical pain. Try these first as prescribed. A narcotic prescription will also be given for breakthrough pain. Over the counter stool softeners and laxatives may be used if needed. Narcotics and anesthesia sometimes cause nausea and vomiting. If persistent please call the office. Do not hesitate to call with questions or concerns. Discharge Instructions Attachments/References COPD: BREATHING TECHNIQUES (ENGLISH) Discharge Orders None [facility] Announcement We are excited to announce that we are making your provider's discharge notes available to you in [employer].   You will see these notes when they are completed and signed by the physician that discharged you from your recent hospital stay. If you have any questions or concerns about any information you see in [employer], please call the Health Information Department where you were seen or reach out to your Primary Care Provider for more information about your plan of care. Introducing [facility]! Lupe Ortiz introduces [employer] patient portal. Now you can access parts of your medical record, email your doctor's office, and request medication refills online. 1. In your internet browser, go to [email or web address] 2. Click on the First Time User? Click Here link in the Sign In box. You will see the New Member Sign Up page. 3. Enter your [employer] Access Code exactly as it appears below. You will not need to use this code after youve completed the sign-up process. If you do not sign up before the expiration date, you must request a new code. · [employer] Access Code: [email or web address] Expires: 11/16/2017  9:00 AM 
 
4. Enter the last four digits of your Social Security Number (xxxx) and Date of Birth (mm/dd/yyyy) as indicated and click Submit. You will be taken to the next sign-up page. 5. Create a [employer] ID. This will be your [employer] login ID and cannot be changed, so think of one that is secure and easy to remember. 6. Create a [employer] password. You can change your password at any time. 7. Enter your Password Reset Question and Answer. This can be used at a later time if you forget your password. 8. Enter your e-mail address. You will receive e-mail notification when new information is available in [address] 9. Click Sign Up. You can now view and download portions of your medical record. 10. Click the Download Summary menu link to download a portable copy of your medical information.  
 
If you have questions, please visit the Frequently Asked Questions section of [employer]. Remember, MyChart is NOT to be used for urgent needs. For medical emergencies, dial 911. Now available from your iPhone and Android! General Information Please provide this summary of care documentation to your next provider. Patient Signature:  ____________________________________________________________ Date:  ____________________________________________________________  
  
Frank Leon Provider Signature:  ____________________________________________________________ Date:  ____________________________________________________________ More Information Breathing Techniques for COPD: Care Instructions Your Care Instructions Breathing is hard when you have chronic obstructive pulmonary disease (COPD). You may take quick, short breaths. Breathing this way makes it harder to get air into your lungs. Learning new ways to control your breathing may help. You may feel better and be able to do more. You can try three basic ways to control your breathing. They are pursed-lip breathing, diaphragmatic breathing, and breathing while bending. Use these methods when you are more short of breath than normal. Practice them often so you can do them well. Follow-up care is a key part of your treatment and safety. Be sure to make and go to all appointments, and call your doctor if you are having problems. It's also a good idea to know your test results and keep a list of the medicines you take. How can you care for yourself at home? · Pursed-lip breathing helps you breathe more air out so that your next breath can be deeper. For this type of breathing, you breathe in through your nose and out through your mouth while almost closing your lips. Breathe in for about 2 seconds, and breathe out for 4 to 6 seconds. Pursed-lip breathing decreases shortness of breath and improves your ability to exercise. · Diaphragmatic breathing helps your lungs expand so that they take in more air. ¨ Lie on your back, or prop yourself up on several pillows. ¨ Put one hand on your belly and the other on your chest. When you breathe in, push your belly out as far as possible. You should feel the hand on your belly move out, while the hand on your chest does not move. ¨ When you breathe out, you should feel the hand on your belly move in. When you can do this type of breathing well while lying down, learn to do it while sitting or standing. Many people with COPD find this breathing method helpful. ¨ Practice diaphragmatic breathing for 20 minutes, 2 or 3 times a day. · Breathing while bending forward at the waist may make breathing easier. It can reduce shortness of breath while you exercise or rest. It helps the diaphragm move more easily. The diaphragm is a large muscle that separates your lungs from your belly. It helps draw air into your lungs as you breathe. · Do not smoke. Smoking makes COPD worse. If you need help quitting, talk to your doctor about stop-smoking programs and medicines. These can increase your chances of quitting for good. When should you call for help? Call your doctor now or seek immediate medical care if: 
· Your breathing methods do not help. · Your shortness of breath gets worse. · You cough up blood. · You have swelling in your belly and legs. · You have severe chest pain. Watch closely for changes in your health, and be sure to contact your doctor if you have any problems. Where can you learn more? Go to http://slime-radames.info/. Enter D565 in the search box to learn more about \"Breathing Techniques for COPD: Care Instructions. \" Current as of: March 25, 2017 Content Version: 11.3 © 9837-5620 [employer].  Care instructions adapted under license by [employer] (which disclaims liability or warranty for this information). If you have questions about a medical condition or this instruction, always ask your healthcare professional. [place] [address] any warranty or liability for your use of this information.

## 2017-08-15 NOTE — ED NOTES
Assumed care of patient. Complaints of abdominal pain 8/10 with some relief lying versus sitting. VS obtained and placed on monitor x3. Wife at bedside.

## 2017-08-16 ENCOUNTER — APPOINTMENT (OUTPATIENT)
Dept: NUCLEAR MEDICINE | Age: 72
DRG: 417 | End: 2017-08-16
Attending: PHYSICIAN ASSISTANT
Payer: MEDICARE

## 2017-08-16 LAB
ALBUMIN SERPL BCP-MCNC: 2.9 G/DL (ref 3.5–5)
ALBUMIN/GLOB SERPL: 0.7 {RATIO} (ref 1.1–2.2)
ALP SERPL-CCNC: 112 U/L (ref 45–117)
ALT SERPL-CCNC: 56 U/L (ref 12–78)
ANION GAP BLD CALC-SCNC: 6 MMOL/L (ref 5–15)
AST SERPL W P-5'-P-CCNC: 42 U/L (ref 15–37)
BASOPHILS # BLD: 0.1 K/UL (ref 0–0.1)
BASOPHILS NFR BLD: 1 % (ref 0–1)
BILIRUB SERPL-MCNC: 0.4 MG/DL (ref 0.2–1)
BUN SERPL-MCNC: 17 MG/DL (ref 6–20)
BUN/CREAT SERPL: 21 (ref 12–20)
CALCIUM SERPL-MCNC: 7.6 MG/DL (ref 8.5–10.1)
CHLORIDE SERPL-SCNC: 108 MMOL/L (ref 97–108)
CHOLEST SERPL-MCNC: 159 MG/DL
CO2 SERPL-SCNC: 22 MMOL/L (ref 21–32)
CREAT SERPL-MCNC: 0.81 MG/DL (ref 0.7–1.3)
EOSINOPHIL # BLD: 0.5 K/UL (ref 0–0.4)
EOSINOPHIL NFR BLD: 6 % (ref 0–7)
ERYTHROCYTE [DISTWIDTH] IN BLOOD BY AUTOMATED COUNT: 13.1 % (ref 11.5–14.5)
EST. AVERAGE GLUCOSE BLD GHB EST-MCNC: 123 MG/DL
GLOBULIN SER CALC-MCNC: 4.1 G/DL (ref 2–4)
GLUCOSE SERPL-MCNC: 145 MG/DL (ref 65–100)
HBA1C MFR BLD: 5.9 % (ref 4.2–6.3)
HCT VFR BLD AUTO: 34.1 % (ref 36.6–50.3)
HDLC SERPL-MCNC: 37 MG/DL
HDLC SERPL: 4.3 {RATIO} (ref 0–5)
HGB BLD-MCNC: 12 G/DL (ref 12.1–17)
LDLC SERPL CALC-MCNC: 90.8 MG/DL (ref 0–100)
LIPASE SERPL-CCNC: >3000 U/L (ref 73–393)
LIPID PROFILE,FLP: ABNORMAL
LYMPHOCYTES # BLD: 1.2 K/UL (ref 0.8–3.5)
LYMPHOCYTES NFR BLD: 12 % (ref 12–49)
MAGNESIUM SERPL-MCNC: 2.1 MG/DL (ref 1.6–2.4)
MCH RBC QN AUTO: 35 PG (ref 26–34)
MCHC RBC AUTO-ENTMCNC: 35.2 G/DL (ref 30–36.5)
MCV RBC AUTO: 99.4 FL (ref 80–99)
MONOCYTES # BLD: 0.6 K/UL (ref 0–1)
MONOCYTES NFR BLD: 6 % (ref 5–13)
NEUTS SEG # BLD: 7.5 K/UL (ref 1.8–8)
NEUTS SEG NFR BLD: 75 % (ref 32–75)
PLATELET # BLD AUTO: 130 K/UL (ref 150–400)
POTASSIUM SERPL-SCNC: 3.8 MMOL/L (ref 3.5–5.1)
PROT SERPL-MCNC: 7 G/DL (ref 6.4–8.2)
RBC # BLD AUTO: 3.43 M/UL (ref 4.1–5.7)
SODIUM SERPL-SCNC: 136 MMOL/L (ref 136–145)
TRIGL SERPL-MCNC: 156 MG/DL (ref ?–150)
VLDLC SERPL CALC-MCNC: 31.2 MG/DL
WBC # BLD AUTO: 9.8 K/UL (ref 4.1–11.1)

## 2017-08-16 PROCEDURE — 94640 AIRWAY INHALATION TREATMENT: CPT

## 2017-08-16 PROCEDURE — 74011250637 HC RX REV CODE- 250/637: Performed by: INTERNAL MEDICINE

## 2017-08-16 PROCEDURE — 74011250636 HC RX REV CODE- 250/636: Performed by: INTERNAL MEDICINE

## 2017-08-16 PROCEDURE — 85025 COMPLETE CBC W/AUTO DIFF WBC: CPT | Performed by: INTERNAL MEDICINE

## 2017-08-16 PROCEDURE — 80061 LIPID PANEL: CPT | Performed by: INTERNAL MEDICINE

## 2017-08-16 PROCEDURE — 97162 PT EVAL MOD COMPLEX 30 MIN: CPT

## 2017-08-16 PROCEDURE — 83036 HEMOGLOBIN GLYCOSYLATED A1C: CPT | Performed by: INTERNAL MEDICINE

## 2017-08-16 PROCEDURE — 80053 COMPREHEN METABOLIC PANEL: CPT | Performed by: INTERNAL MEDICINE

## 2017-08-16 PROCEDURE — 36415 COLL VENOUS BLD VENIPUNCTURE: CPT | Performed by: INTERNAL MEDICINE

## 2017-08-16 PROCEDURE — 65660000000 HC RM CCU STEPDOWN

## 2017-08-16 PROCEDURE — 74011250636 HC RX REV CODE- 250/636

## 2017-08-16 PROCEDURE — G8980 MOBILITY D/C STATUS: HCPCS

## 2017-08-16 PROCEDURE — G8978 MOBILITY CURRENT STATUS: HCPCS

## 2017-08-16 PROCEDURE — 74011000250 HC RX REV CODE- 250: Performed by: INTERNAL MEDICINE

## 2017-08-16 PROCEDURE — G8979 MOBILITY GOAL STATUS: HCPCS

## 2017-08-16 PROCEDURE — 83735 ASSAY OF MAGNESIUM: CPT | Performed by: INTERNAL MEDICINE

## 2017-08-16 PROCEDURE — A9537 TC99M MEBROFENIN: HCPCS

## 2017-08-16 PROCEDURE — 97165 OT EVAL LOW COMPLEX 30 MIN: CPT | Performed by: OCCUPATIONAL THERAPIST

## 2017-08-16 PROCEDURE — 83690 ASSAY OF LIPASE: CPT | Performed by: INTERNAL MEDICINE

## 2017-08-16 RX ORDER — LEVOFLOXACIN 5 MG/ML
750 INJECTION, SOLUTION INTRAVENOUS EVERY 24 HOURS
Status: DISCONTINUED | OUTPATIENT
Start: 2017-08-16 | End: 2017-08-17

## 2017-08-16 RX ORDER — PHENYTOIN SODIUM 100 MG/1
100 CAPSULE, EXTENDED RELEASE ORAL ONCE
Status: COMPLETED | OUTPATIENT
Start: 2017-08-16 | End: 2017-08-16

## 2017-08-16 RX ORDER — PHENYTOIN SODIUM 100 MG/1
200 CAPSULE, EXTENDED RELEASE ORAL EVERY EVENING
Status: DISCONTINUED | OUTPATIENT
Start: 2017-08-17 | End: 2017-08-18 | Stop reason: HOSPADM

## 2017-08-16 RX ORDER — TAMSULOSIN HYDROCHLORIDE 0.4 MG/1
0.4 CAPSULE ORAL
Status: COMPLETED | OUTPATIENT
Start: 2017-08-16 | End: 2017-08-16

## 2017-08-16 RX ORDER — PHENYTOIN SODIUM 100 MG/1
100 CAPSULE, EXTENDED RELEASE ORAL DAILY
Status: DISCONTINUED | OUTPATIENT
Start: 2017-08-17 | End: 2017-08-18 | Stop reason: HOSPADM

## 2017-08-16 RX ADMIN — Medication 10 ML: at 21:13

## 2017-08-16 RX ADMIN — IPRATROPIUM BROMIDE AND ALBUTEROL SULFATE 3 ML: .5; 3 SOLUTION RESPIRATORY (INHALATION) at 20:56

## 2017-08-16 RX ADMIN — Medication 10 ML: at 05:27

## 2017-08-16 RX ADMIN — SINCALIDE 1.21 MCG: 5 INJECTION, POWDER, LYOPHILIZED, FOR SOLUTION INTRAVENOUS at 14:10

## 2017-08-16 RX ADMIN — SODIUM CHLORIDE 75 ML/HR: 900 INJECTION, SOLUTION INTRAVENOUS at 18:31

## 2017-08-16 RX ADMIN — METHYLPREDNISOLONE SODIUM SUCCINATE 60 MG: 125 INJECTION, POWDER, FOR SOLUTION INTRAMUSCULAR; INTRAVENOUS at 05:27

## 2017-08-16 RX ADMIN — FAMOTIDINE 20 MG: 10 INJECTION, SOLUTION INTRAVENOUS at 08:28

## 2017-08-16 RX ADMIN — UMECLIDINIUM 1 PUFF: 62.5 AEROSOL, POWDER ORAL at 08:32

## 2017-08-16 RX ADMIN — TAMSULOSIN HYDROCHLORIDE 0.4 MG: 0.4 CAPSULE ORAL at 21:31

## 2017-08-16 RX ADMIN — CLOPIDOGREL BISULFATE 75 MG: 75 TABLET ORAL at 08:27

## 2017-08-16 RX ADMIN — PHENYTOIN SODIUM 100 MG: 100 CAPSULE ORAL at 18:31

## 2017-08-16 RX ADMIN — METHYLPREDNISOLONE SODIUM SUCCINATE 60 MG: 125 INJECTION, POWDER, FOR SOLUTION INTRAMUSCULAR; INTRAVENOUS at 16:36

## 2017-08-16 RX ADMIN — METHYLPREDNISOLONE SODIUM SUCCINATE 60 MG: 125 INJECTION, POWDER, FOR SOLUTION INTRAMUSCULAR; INTRAVENOUS at 21:12

## 2017-08-16 RX ADMIN — LEVOTHYROXINE SODIUM 75 MCG: 75 TABLET ORAL at 08:26

## 2017-08-16 RX ADMIN — IPRATROPIUM BROMIDE AND ALBUTEROL SULFATE 3 ML: .5; 3 SOLUTION RESPIRATORY (INHALATION) at 07:27

## 2017-08-16 RX ADMIN — PHENYTOIN SODIUM 100 MG: 100 CAPSULE ORAL at 08:27

## 2017-08-16 RX ADMIN — ENOXAPARIN SODIUM 40 MG: 30 INJECTION SUBCUTANEOUS at 08:28

## 2017-08-16 RX ADMIN — PHENYTOIN SODIUM 100 MG: 100 CAPSULE ORAL at 17:43

## 2017-08-16 RX ADMIN — ASPIRIN 81 MG 81 MG: 81 TABLET ORAL at 08:27

## 2017-08-16 RX ADMIN — FLUTICASONE FUROATE AND VILANTEROL TRIFENATATE 1 PUFF: 100; 25 POWDER RESPIRATORY (INHALATION) at 08:32

## 2017-08-16 RX ADMIN — Medication 10 ML: at 16:37

## 2017-08-16 RX ADMIN — FAMOTIDINE 20 MG: 10 INJECTION, SOLUTION INTRAVENOUS at 21:13

## 2017-08-16 NOTE — PROGRESS NOTES
ADULT PROTOCOL: JET AEROSOL ASSESSMENT    Patient  Ester Kumar     74 y.o.   male     8/16/2017  1:21 AM    Breath Sounds Pre Procedure: Right Breath Sounds: Diminished                               Left Breath Sounds: Diminished    Breath Sounds Post Procedure: Right Breath Sounds: Diminished                                 Left Breath Sounds: Diminished    Breathing pattern: Pre procedure Breathing Pattern: Regular          Post procedure Breathing Pattern: Regular    Heart Rate: Pre procedure Pulse: 79           Post procedure Pulse: 87    Resp Rate: Pre procedure Respirations: 16           Post procedure Respirations: 16    Peak Flow: Pre bronchodilator   n/a          Post bronchodilator   n/a    FVC/FEV1: n/a    Incentive Spirometry:   n/a          Cough: Pre procedure Cough:  (no cough)               Post procedure Cough: Non-productive    Suctioned: NO    Sputum: Pre procedure  none                 Post procedure  none    Oxygen: O2 Device: Nasal cannula   Flow rate (L/min) 2     Changed: NO    SpO2: Pre procedure SpO2: 94 %   with oxygen              Post procedure SpO2: 92 %  with oxygen    Nebulizer Therapy: Current medications        Changed: NO    Smoking History: unknown not in pt history    Problem List:   Patient Active Problem List   Diagnosis Code    Acute pancreatitis K85.90    COPD exacerbation (HCC) J44.1    UTI (urinary tract infection) N39.0    CAD (coronary artery disease) I25.10    Acquired hypothyroidism E03.9    S/P AAA repair [id number], Z86.79       Respiratory Therapist: Jasen Phillips RT

## 2017-08-16 NOTE — ED NOTES
TRANSFER - OUT REPORT:    Verbal report given to Yris Vivar RN on [employer]  being transferred to Renal for routine progression of care       Report consisted of patients Situation, Background, Assessment and   Recommendations(SBAR). Information from the following report(s) SBAR, Kardex, ED Summary, [facility] and Recent Results was reviewed with the receiving nurse. Lines:   Peripheral IV 08/15/17 Right Forearm (Active)   Site Assessment Clean, dry, & intact 8/15/2017  3:07 PM   Phlebitis Assessment 0 8/15/2017  3:07 PM   Infiltration Assessment 0 8/15/2017  3:07 PM   Dressing Status Clean, dry, & intact 8/15/2017  3:07 PM        Opportunity for questions and clarification was provided.

## 2017-08-16 NOTE — PROGRESS NOTES
TRANSFER - IN REPORT:    Verbal report received from Becca(name) on Gary Rincon  being received from ED(unit) for routine progression of care      Report consisted of patients Situation, Background, Assessment and   Recommendations(SBAR). Information from the following report(s) SBAR, Kardex, ED Summary, Intake/Output, MAR and Recent Results was reviewed with the receiving nurse. Opportunity for questions and clarification was provided. Assessment completed upon patients arrival to unit and care assumed.

## 2017-08-16 NOTE — PROGRESS NOTES
Bedside shift change report given to Gerson Gibson (oncoming nurse) by Mark Rubio (offgoing nurse). Report included the following information SBAR, Kardex, Intake/Output, MAR and Recent Results. Zone Phone for oncoming shift:   [phone]    Shift Summary: Pt had hepatobiliary duct scan today and was seen by GI. LDAs               Peripheral IV 08/16/17 Left Wrist (Active)   Site Assessment Clean, dry, & intact 8/16/2017  4:15 PM   Phlebitis Assessment 0 8/16/2017  4:15 PM   Infiltration Assessment 0 8/16/2017  4:15 PM   Dressing Status Clean, dry, & intact 8/16/2017  4:15 PM   Dressing Type Transparent;Tape 8/16/2017  4:15 PM   Hub Color/Line Status Pink; Infusing 8/16/2017  4:15 PM                        Intake & Output   Date 08/15/17 1900 - 08/16/17 0659 08/16/17 0700 - 08/17/17 0659   Shift 2721-9944 24 Hour Total 0871-5438 3625-1329 24 Hour Total   I  N  T  A  K  E   P. O.   0  0      P. O.   0  0    I.V.  (mL/kg/hr)   700  (1) 900 1600      I.V.   700  700      Volume (0.9% sodium chloride infusion)    900 900    Shift Total  (mL/kg)   700  (11.6) 900  (14.9) 1600  (26.4)   O  U  T  P  U  T   Urine  (mL/kg/hr) 550 700         Urine Voided 550 700       Stool           Stool Occurrence(s)   1 x  1 x    Shift Total  (mL/kg) 550  (9.1) 700  (11.6)      NET -550 -700    Weight (kg) 60.6 60.6 60.6 60.6 60.6      Last Bowel Movement Last Bowel Movement Date: 08/16/17   Glucose Checks [] N/A  [] AC/HS  [] Q6  Concerns:   Nutrition Active Orders   Diet    DIET NPO Except Meds       Consults []PT  []OT  []Speech  []Case Management   Cardiac Monitoring []N/A []Yes Expires:

## 2017-08-16 NOTE — PROGRESS NOTES
Primary Nurse Penny Seals and Idalia Claudio RN performed a dual skin assessment on this patient No impairment noted  Valdemar score is 20

## 2017-08-16 NOTE — PROGRESS NOTES
Pharmacy Automatic Renal Dosing Protocol - Antimicrobials    Indication for Antimicrobials: urinary tract infection  Current Regimen of Each Antimicrobial (Start Day & Day of Therapy):  Levofloxacin 500 mg daily (day 2, started 8/15)    Significant Cultures:   Urine 8/15: GNR 60,000 CFU    CAPD, Hemodialysis or Renal Replacement Therapy: NA   Paralysis, amputations, malnutrition: NA    Recent Labs      17   0001  08/15/17   1301   CREA  0.81  1.11   BUN  17  24*   WBC  9.8  10.7     Temp (24hrs), Av.8 °F (36.6 °C), Min:97.3 °F (36.3 °C), Max:98.2 °F (36.8 °C)    Creatinine Clearance (Creatinine Clearance (ml/min)): >50     Impression/Plan:   Complicated UTI due to being male. Will adjust levofloxacin to 750 mg IV daily per protocol for CrCl >50 and follow. Pharmacy will follow daily and adjust medications as appropriate for renal function and/or serum levels.     Thank you,  Aubrey Rae, JACINTOD     Renal Dosing Tables on [employer]

## 2017-08-16 NOTE — ED NOTES
Pt requesting food to eat. Pt has not been seen by hospitalist at this time. Caro Lalma notified and per [facility], pt may eat. Pt to have soup to eat. Verbal orders for 4 mg IV Zofran given for pt to be medicated prior to eating.

## 2017-08-16 NOTE — PROGRESS NOTES
Occupational Therapy EVALUATION/discharge  Patient: Elvi Farmer (69 y.o. male)  Date: 8/16/2017  Primary Diagnosis: Acute pancreatitis        Precautions: Fall    ASSESSMENT:   Based on the objective data described below, the patient presents at an overall independent level with ADLs and functional mobility. O2 sats initially at 88%, but with PLB his sats were 91% and greater. He demonstrated good safety awareness and no LOB. Further skilled acute occupational therapy is not indicated at this time. Discharge Recommendations: None  Further Equipment Recommendations for Discharge: none      SUBJECTIVE:   Patient stated My pain is gone.     OBJECTIVE DATA SUMMARY:   HISTORY:   Past Medical History:   Diagnosis Date    CAD (coronary artery disease)     Other ill-defined conditions     marce kc, MI (1995), aortic aneurysm     Past Surgical History:   Procedure Laterality Date    ABDOMEN SURGERY PROC UNLISTED      hernia     CARDIAC SURG PROCEDURE UNLIST      stents placed    HX HEENT      cateracts removed, lens implants    HX OTHER SURGICAL      abdominal anuersym       Prior Level of Function/Home Situation: Independent, drives  Home Situation  Home Environment: Private residence  # Steps to Enter: 4  Rails to Enter: Yes  Hand Rails : Bilateral  One/Two Story Residence: One story  Living Alone: No  Support Systems: Spouse/Significant Other/Partner, Family member(s)  Patient Expects to be Discharged to[de-identified] Private residence  Current DME Used/Available at Home: Cane, straight  Tub or Shower Type: Tub/Shower combination  [x]  Right hand dominant   []  Left hand dominant    EXAMINATION OF PERFORMANCE DEFICITS:  Cognitive/Behavioral Status:  Neurologic State: Alert; Appropriate for age  Orientation Level: Oriented X4  Cognition: Appropriate decision making; Appropriate for age attention/concentration; Appropriate safety awareness; Follows commands  Perception: Appears intact  Perseveration: No perseveration noted  Safety/Judgement: Awareness of environment; Fall prevention;Good awareness of safety precautions; Home safety; Insight into deficits    Hearing: Auditory  Auditory Impairment: None    Vision/Perceptual:    Acuity: Within Defined Limits    Corrective Lenses: Glasses    Range of Motion:  WFL                            Strength:  WFL                   Coordination:     Fine Motor Skills-Upper: Left Impaired;Right Impaired (mild UE tremors)    Gross Motor Skills-Upper: Right Intact    Tone & Sensation:  INT                            Balance:  Sitting: Intact  Standing: Intact    Functional Mobility and Transfers for ADLs:  Bed Mobility:  Rolling: Independent  Supine to Sit: Independent  Sit to Supine: Independent  Scooting: Independent    Transfers:  Sit to Stand: Independent  Stand to Sit: Independent  Toilet Transfer : Independent    ADL Assessment:  Feeding: Independent    Oral Facial Hygiene/Grooming: Independent    Bathing: Independent    Upper Body Dressing: Independent    Lower Body Dressing: Independent    Toileting: Independent                ADL Intervention and task modifications:  Patient was educated on the benefits of maintaining activity tolerance, functional mobility, and independence with self care tasks during acute stay. Encouraged patient to be out of bed for all meals, perform daily ADLs (as approved by RN/MD regarding bathing etc), performing functional mobility to/from bathroom, and increasing time OOB daily. Patient educated about the importance of maintaining activity tolerance to ensure safe return home and to baseline. Patient verbalized understanding of education. Cognitive Retraining  Safety/Judgement: Awareness of environment; Fall prevention;Good awareness of safety precautions; Home safety; Insight into deficits    Functional Measure:  Barthel Index:    Bathin  Bladder: 10  Bowels: 10  Groomin  Dressing: 10  Feeding: 10  Mobility: 15  Stairs: 10  Toilet Use: 10  Transfer (Bed to Chair and Back): 15  Total: 100       Barthel and G-code impairment scale:  Percentage of impairment CH  0% CI  1-19% CJ  20-39% CK  40-59% CL  60-79% CM  80-99% CN  100%   Barthel Score 0-100 100 99-80 79-60 59-40 20-39 1-19   0   Barthel Score 0-20 20 17-19 13-16 9-12 5-8 1-4 0      The Barthel ADL Index: Guidelines  1. The index should be used as a record of what a patient does, not as a record of what a patient could do. 2. The main aim is to establish degree of independence from any help, physical or verbal, however minor and for whatever reason. 3. The need for supervision renders the patient not independent. 4. A patient's performance should be established using the best available evidence. Asking the patient, friends/relatives and nurses are the usual sources, but direct observation and common sense are also important. However direct testing is not needed. 5. Usually the patient's performance over the preceding 24-48 hours is important, but occasionally longer periods will be relevant. 6. Middle categories imply that the patient supplies over 50 per cent of the effort. 7. Use of aids to be independent is allowed. Alex Gomes., Barthel, D.W. ([id number]). Functional evaluation: the Barthel Index. [address] [facility] (14)2. ZURI Childress, Joanne Black., [facility] [place], [address] [facility] (1999). Measuring the change indisability after inpatient rehabilitation; comparison of the responsiveness of the Barthel Index and Functional [place] Measure. Journal of Neurology, Neurosurgery, and Psychiatry, 66(4), 805-510. Sonny Lima, N.J.A, DILLON Doa, & Indra Parks, M.A. (2004.) Assessment of post-stroke quality of life in cost-effectiveness studies: The usefulness of the Barthel Index and the EuroQoL-5D. Quality of Life Research, 13, 733-67       G codes:   In compliance with CMSs Claims Based Outcome Reporting, the following G-code set was chosen for this patient based on their primary functional limitation being treated: The outcome measure chosen to determine the severity of the functional limitation was the Barthel Index with a score of 100/100 which was correlated with the impairment scale. ? Self Care:     - CURRENT STATUS: CH - 0% impaired, limited or restricted    - GOAL STATUS: CH - 0% impaired, limited or restricted    - D/C STATUS:  CH - 0% impaired, limited or restricted       Occupational Therapy Evaluation Charge Determination   History Examination Decision-Making   LOW Complexity : Brief history review  LOW Complexity : 1-3 performance deficits relating to physical, cognitive , or psychosocial skils that result in activity limitations and / or participation restrictions  LOW Complexity : No comorbidities that affect functional and no verbal or physical assistance needed to complete eval tasks       Based on the above components, the patient evaluation is determined to be of the following complexity level: LOW   Pain:Pain Scale 1: Numeric (0 - 10)  Pain Intensity 1: 0              Activity Tolerance:   Good  Please refer to the flowsheet for vital signs taken during this treatment. After treatment:   [x]  Patient left in no apparent distress sitting up in chair  []  Patient left in no apparent distress in bed  [x]  Call bell left within reach  [x]  Nursing notified  []  Caregiver present  []  Bed alarm activated    COMMUNICATION/EDUCATION:   Communication/Collaboration:  [x]      Home safety education was provided and the patient/caregiver indicated understanding. [x]      Patient/family have participated as able and agree with findings and recommendations. []      Patient is unable to participate in plan of care at this time.   Findings and recommendations were discussed with: Physical Therapist and Registered Nurse    Nomi Bourne OT  Time Calculation: 12 mins

## 2017-08-16 NOTE — PROGRESS NOTES
physical Therapy EVALUATION/DISCHARGE  Patient: Emily Malik (54 y.o. male)  Date: 8/16/2017  Primary Diagnosis: Acute pancreatitis        Precautions:   Fall  ASSESSMENT :  Based on the objective data described below, the patient presents with good strength, good functional mobility, and steady gait following admission for acute pancreatitis. PTA patient lives with his wife. He is independent with all aspects of functional mobility and ADLs. He denies any falls. Currently, patient presents at her baseline. Patient received on 2L O2 at rest. Doffed NC and patient's O2 sats at 88% on RA. Patient is independent with bed mobility and transfers. Patient ambulated 200 feet independently with safe and steady gait. O2 sats improved to 92% on RA with ambulation. PT services are not indicated at this time. Patient left sitting in the chair at the conclusion of PT evaluation. Patient will not need PT services at discharge. Skilled physical therapy is not indicated at this time. PLAN :  Discharge Recommendations: None  Further Equipment Recommendations for Discharge: none     SUBJECTIVE:   Patient stated I feel fine.     OBJECTIVE DATA SUMMARY:   HISTORY:    Past Medical History:   Diagnosis Date    CAD (coronary artery disease)     Other ill-defined conditions     marce kc, MI (1995), aortic aneurysm     Past Surgical History:   Procedure Laterality Date    ABDOMEN SURGERY PROC UNLISTED      hernia     CARDIAC SURG PROCEDURE UNLIST      stents placed    HX HEENT      cateracts removed, lens implants    HX OTHER SURGICAL      abdominal anuersym     Prior Level of Function/Home Situation: patient lives with his wife. He is independent with all aspects of functional mobility and ADLs. He denies any falls.    Personal factors and/or comorbidities impacting plan of care:     Home Situation  Home Environment: Private residence  # Steps to Enter: 4  Rails to Enter: Yes  Hand Rails : Bilateral  One/Two Story Residence: One story  Living Alone: No  Support Systems: Spouse/Significant Other/Partner, Family member(s)  Patient Expects to be Discharged to[de-identified] Private residence  Current DME Used/Available at Home: Aldona Alex, straight  Tub or Shower Type: Tub/Shower combination    EXAMINATION/PRESENTATION/DECISION MAKING:   Critical Behavior:  Neurologic State: Alert, Appropriate for age  Orientation Level: Oriented X4  Cognition: Appropriate decision making, Appropriate for age attention/concentration, Appropriate safety awareness, Follows commands  Safety/Judgement: Awareness of environment, Fall prevention, Good awareness of safety precautions, Home safety, Insight into deficits  Hearing: Auditory  Auditory Impairment: None  Skin:  intact  Edema: none       Vision:   Acuity: Within Defined Limits  Corrective Lenses: Glasses  Functional Mobility:  Bed Mobility:  Rolling: Independent  Supine to Sit: Independent  Sit to Supine: Independent  Scooting: Independent  Transfers:  Sit to Stand: Independent  Stand to Sit: Independent                       Balance:   Sitting: Intact  Standing: Intact  Ambulation/Gait Training:  Distance (ft): 200 Feet (ft)  Assistive Device: Gait belt  Ambulation - Level of Assistance: Independent     Gait Description (WDL): Exceptions to WDL           Base of Support: Widened                     Therapeutic Exercises:       Functional Measure:  Barthel Index:    Bathin  Bladder: 10  Bowels: 10  Groomin  Dressing: 10  Feeding: 10  Mobility: 15  Stairs: 10  Toilet Use: 10  Transfer (Bed to Chair and Back): 15  Total: 100       Barthel and G-code impairment scale:  Percentage of impairment CH  0% CI  1-19% CJ  20-39% CK  40-59% CL  60-79% CM  80-99% CN  100%   Barthel Score 0-100 100 99-80 79-60 59-40 20-39 1-19   0   Barthel Score 0-20 20 17-19 13-16 9-12 5-8 1-4 0      The Barthel ADL Index: Guidelines  1.  The index should be used as a record of what a patient does, not as a record of what a patient could do. 2. The main aim is to establish degree of independence from any help, physical or verbal, however minor and for whatever reason. 3. The need for supervision renders the patient not independent. 4. A patient's performance should be established using the best available evidence. Asking the patient, friends/relatives and nurses are the usual sources, but direct observation and common sense are also important. However direct testing is not needed. 5. Usually the patient's performance over the preceding 24-48 hours is important, but occasionally longer periods will be relevant. 6. Middle categories imply that the patient supplies over 50 per cent of the effort. 7. Use of aids to be independent is allowed. Anjel Ahmadi., Barthel, D.W. ([id number]). Functional evaluation: the Barthel Index. [address] [facility] (14)2. ZURI Aquino, Aristides Pierre., Aylin León., [place], [address] [place] [address] (1999). Measuring the change indisability after inpatient rehabilitation; comparison of the responsiveness of the Barthel Index and Functional [place] Measure. Journal of Neurology, Neurosurgery, and Psychiatry, 66(4), 977-706. Jessica Barba, N.J.A, DILLON Dao, & Rito Huerta, M.A. (2004.) Assessment of post-stroke quality of life in cost-effectiveness studies: The usefulness of the Barthel Index and the EuroQoL-5D. Quality of Life Research, 13, 493-72         G codes: In compliance with CMSs Claims Based Outcome Reporting, the following G-code set was chosen for this patient based on their primary functional limitation being treated: The outcome measure chosen to determine the severity of the functional limitation was the Barthel with a score of 100/100 which was correlated with the impairment scale.     ? Mobility - Walking and Moving Around:     - CURRENT STATUS: CH - 0% impaired, limited or restricted    - GOAL STATUS: CH - 0% impaired, limited or restricted    - D/C STATUS:  CH - 0% impaired, limited or restricted          Physical Therapy Evaluation Charge Determination   History Examination Presentation Decision-Making   MEDIUM  Complexity : 1-2 comorbidities / personal factors will impact the outcome/ POC  LOW Complexity : 1-2 Standardized tests and measures addressing body structure, function, activity limitation and / or participation in recreation  MEDIUM Complexity : Evolving with changing characteristics  Other outcome measures Barthel  LOW       Based on the above components, the patient evaluation is determined to be of the following complexity level: LOW     Pain:Pain Scale 1: Numeric (0 - 10)  Pain Intensity 1: 0     Activity Tolerance:   Good, at baseline. O2 sats at 92% on RA. RN notified. Please refer to the flowsheet for vital signs taken during this treatment. After treatment:   [x]   Patient left in no apparent distress sitting up in chair  []   Patient left in no apparent distress in bed  [x]   Call bell left within reach  [x]   Nursing notified  []   Caregiver present  []   Bed alarm activated    COMMUNICATION/EDUCATION:   Communication/Collaboration:  [x]   Fall prevention education was provided and the patient/caregiver indicated understanding. [x]   Patient/family have participated as able and agree with findings and recommendations. []   Patient is unable to participate in plan of care at this time.   Findings and recommendations were discussed with: Occupational Therapist, Registered Nurse and     Thank you for this referral.  Denia Olivo, PT, DPT   Time Calculation: 10 mins

## 2017-08-16 NOTE — PROGRESS NOTES
Pt expressing that dilantin dosage is not correct for evening dose. Paged Dr. Michael Randhawa to notify. Received verbal order for 200 mg dilantin for evening dose. Put in order for 100 mg now dose to reflect total 200 mg and changed orders to reflect correct dosage for morning (100 mg) and evening (200 mg).

## 2017-08-16 NOTE — PROGRESS NOTES
Bedside shift change report given to [address] [place] [address] (oncoming nurse) by Bart Wesley (offgoing nurse). Report included the following information SBAR, Kardex, Intake/Output, MAR and Recent Results. Zone Phone for oncoming shift:   [phone]    Shift Summary: Pt rested quietly through night. No c/o pain. LDAs               Peripheral IV 08/16/17 Left Wrist (Active)   Site Assessment Clean, dry, & intact 8/16/2017  7:32 AM   Phlebitis Assessment 0 8/16/2017  7:32 AM   Infiltration Assessment 0 8/16/2017  7:32 AM   Dressing Status Clean, dry, & intact 8/16/2017  7:32 AM   Dressing Type Transparent;Tape 8/16/2017  7:32 AM   Hub Color/Line Status Pink; Infusing 8/16/2017  7:32 AM                        Intake & Output   Date 08/15/17 0700 - 08/16/17 0659 08/16/17 0700 - 08/17/17 0659   Shift 2199-0927 1034-9062 24 Hour Total 6730-1225 0541-9115 24 Hour Total   I  N  T  A  K  E   I.V.  (mL/kg/hr)    700  700      I. V.    700  700    Shift Total  (mL/kg)    700  (11.6)  700  (11.6)   O  U  T  P  U  T   Urine  (mL/kg/hr) 150  (0.2) 550  (0.8) 700  (0.5)         Urine Voided 150 550 700       Shift Total  (mL/kg) 150  (2.5) 550  (9.1) 700  (11.6)      NET -150 -550 -700 700  700   Weight (kg) 60.6 60.6 60.6 60.6 60.6 60.6      Last Bowel Movement Last Bowel Movement Date: 08/14/17   Glucose Checks [] N/A  [] AC/HS  [] Q6  Concerns:   Nutrition Active Orders   Diet    DIET NPO Except Meds       Consults []PT  []OT  []Speech  [x]Case Management   Cardiac Monitoring []N/A [x]Yes Expires:48 hrs

## 2017-08-16 NOTE — H&P
Hospitalist Admission Note    NAME: Yaniv Herndon   :  1945   MRN:  [id number]     Date/Time:  8/15/2017 10:25 PM    Patient PCP: Aysha Kendall MD  ________________________________________________________________________    My assessment of this patient's clinical condition and my plan of care is as follows. Assessment / Plan:  Acute Hypoxic Respiratory Failure POA: O2 sat 86% at room air, will start supplemental O2 and wean down as tolerated. Acute COPD Exacerbation: will start LVQ, bronchodilators, steroids, check sputum. Acute Pancreatitis: with possibly  pancreas divisum, CBD seems not to be dilated and he is not a drinker, will keep NPO, monitor lipase and get GI evaluation. May need MRCP  UTI: start LVQ and F/U cultures. CAD: no chest pain at this time, monitor. Hypothyroidism: will c/w L-thyroxine  H/O Seizures: c/w Phenytoin. Code Status: Full Code  Surrogate Decision Maker: wife Bong Purcell [phone]  DVT Prophylaxis: Lovenox  GI Prophylaxis: Pepcid  Baseline: Fairly independent lives with wife in [facility]:   CHIEF COMPLAINT: \"My belly was hurting\"    HISTORY OF PRESENT ILLNESS:     Yaniv Herndon is a 74 y.o.  male  with PMHx significant for s/p abdominal aneurysm repair and stent placement, and CAD, who presents ambulatory to ED [facility] ED with cc of constant worsening periumbilical abdominal pain for 4 days with associated appetite loss and nausea. Pt also complains of mid back pain for one week. Per the pt's wife, pt has had intermittent abdominal pain throughout the year, and had recently gone to the ED at [facility] yesterday but had stayed there for 8 hours without being seen. She also notes that his PCP had done blood work that had resulted yesterday and shown his WBC to be high and had him scheduled for an US of his gallbladder. He denies having had any recent abdominal surgeries and still has his appendix and gallbladder.  His wife notes that he sees his cardiologist yearly. Pt specifically denies any vomiting, diarrhea, or fever. At this time patient is lying in bed was c/o abdominal pain for which he sought attention at [facility] but left without been seen, and came here, here noted that he has pancreatitis and respiratory failure, denies chest pain,no fever, no Diarrhea, no urinary symptoms, no other associated symptoms. We were asked to admit for work up and evaluation of the above problems. Past Medical History:   Diagnosis Date    CAD (coronary artery disease)     Other ill-defined conditions     marce ca, MI (1995), aortic aneurysm        Past Surgical History:   Procedure Laterality Date    ABDOMEN SURGERY PROC UNLISTED      hernia     CARDIAC SURG PROCEDURE UNLIST      stents placed    HX HEENT      cateracts removed, lens implants    HX OTHER SURGICAL      abdominal anuersym       Social History   Substance Use Topics    Smoking status: Former Smoker     Packs/day: 0.50     Quit date: 8/15/2015    Smokeless tobacco: Never Used      Comment: e-cigarettes    Alcohol use No        Family History   Problem Relation Age of Onset    Elevated Lipids Mother     Heart Disease Mother     Asthma Brother     Cancer Brother     Diabetes Brother     Heart Disease Father      No Known Allergies     Prior to Admission medications    Medication Sig Start Date End Date Taking? Authorizing Provider   tamsulosin (FLOMAX) 0.4 mg capsule Take 0.4 mg by mouth daily. Yes Justin Ochoa MD   tiotropium (SPIRIVA WITH HANDIHALER) 18 mcg inhalation capsule Take 1 Cap by inhalation daily. Yes Justin Ochoa MD   fluticasone-salmeterol (ADVAIR DISKUS) 250-50 mcg/dose diskus inhaler Take 1 Puff by inhalation every twelve (12) hours. Yes Justin Ochoa MD   levothyroxine (SYNTHROID) 75 mcg tablet Take 75 mcg by mouth Daily (before breakfast). Yes Justin Ochoa MD   clopidogrel (PLAVIX) 75 mg tab Take 75 mg by mouth daily.    Yes Justin Ochoa MD aspirin 81 mg tablet Take 81 mg by mouth daily. Yes Justin Ochoa MD   phenytoin ER (DILANTIN ER) 100 mg ER capsule Take 100 mg by mouth two (2) times a day. 1 in morning, 2 at bedtime   Yes Justin Ochoa MD   multivitamins-minerals-lutein (CENTRUM SILVER) Tab Take 1 Tab by mouth daily. Yes Justin Ochoa MD   FISH OIL/BORAGE/FLAX/OM3,6,9#1 (TRIPLE OMEGA 3-6-9 PO) Take 1 Tab by mouth daily. Yes Justin Ochoa MD       REVIEW OF SYSTEMS:     I am not able to complete the review of systems because:    The patient is intubated and sedated    The patient has altered mental status due to his acute medical problems    The patient has baseline aphasia from prior stroke(s)    The patient has baseline dementia and is not reliable historian    The patient is in acute medical distress and unable to provide information           Total of 12 systems reviewed as follows:       POSITIVE= underlined text  Negative = text not underlined  General:  fever, chills, sweats, generalized weakness, weight loss/gain,      loss of appetite   Eyes:    blurred vision, eye pain, loss of vision, double vision  ENT:    rhinorrhea, pharyngitis   Respiratory:   cough, sputum production, SOB, PARSONS, wheezing, pleuritic pain   Cardiology:   chest pain, palpitations, orthopnea, PND, edema, syncope   Gastrointestinal:  abdominal pain , N/V, diarrhea, dysphagia, constipation, bleeding   Genitourinary:  frequency, urgency, dysuria, hematuria, incontinence   Muskuloskeletal :  arthralgia, myalgia, back pain  Hematology:  easy bruising, nose or gum bleeding, lymphadenopathy   Dermatological: rash, ulceration, pruritis, color change / jaundice  Endocrine:   hot flashes or polydipsia   Neurological:  headache, dizziness, confusion, focal weakness, paresthesia,     Speech difficulties, memory loss, gait difficulty  Psychological: Feelings of anxiety, depression, agitation    Objective:   VITALS:    Visit Vitals    /66    Pulse 94    Temp 97.7 °F (36.5 °C)    Resp 19    Ht 5' 8\" (1.727 m)    Wt 60.6 kg (133 lb 9.6 oz)    SpO2 90%    BMI 20.31 kg/m2       PHYSICAL EXAM:    General:    Alert, cooperative, SOB, appears stated age. HEENT: Atraumatic, anicteric sclerae, pink conjunctivae     No oral ulcers, mucosa moist, throat clear, dentition poor  Neck:  Supple, symmetrical,  thyroid: non tender  Lungs:   Coarse BS, rhonchi and mild wheezing  Chest wall:  No tenderness  No Accessory muscle use. Heart:   Regular  rhythm,  No  murmur   No edema  Abdomen:   Soft, epigastric tender. Not distended. Bowel sounds normal  Extremities: No cyanosis. No clubbing,      Skin turgor normal, Capillary refill normal, Radial pulse 2+  Skin:     Not pale. Not Jaundiced  No rashes   Psych:  Good insight. Not depressed. Not anxious or agitated. Neurologic: EOMs intact. No facial asymmetry. No aphasia or slurred speech. Symmetrical strength, Sensation grossly intact. Alert and oriented X 4.     _______________________________________________________________________  Care Plan discussed with:    Comments   Patient y    Family      RN y    Care Manager                    Consultant:      _______________________________________________________________________  Expected  Disposition:   Home with Family y   HH/PT/OT/RN y   SNF/LTC    LARA    ________________________________________________________________________  TOTAL TIME:  [address] Minutes    Critical Care Provided     Minutes non procedure based      Comments    y Reviewed previous records   >50% of visit spent in counseling and coordination of care y Discussion with patient and/or family and questions answered       ________________________________________________________________________  Signed: Panchito Farias MD    Procedures: see electronic medical records for all procedures/Xrays and details which were not copied into this note but were reviewed prior to creation of Plan.     LAB DATA REVIEWED:    Recent Results (from the past 24 hour(s))   URINALYSIS W/ REFLEX CULTURE    Collection Time: 08/15/17 12:50 PM   Result Value Ref Range    Color DARK YELLOW      Appearance CLEAR CLEAR      Specific gravity 1.026 1.003 - 1.030      pH (UA) 5.5 5.0 - 8.0      Protein 30 (A) NEG mg/dL    Glucose NEGATIVE  NEG mg/dL    Ketone 80 (A) NEG mg/dL    Blood NEGATIVE  NEG      Urobilinogen 0.2 0.2 - 1.0 EU/dL    Nitrites NEGATIVE  NEG      Leukocyte Esterase SMALL (A) NEG      WBC 10-20 0 - 4 /hpf    RBC 0-5 0 - 5 /hpf    Epithelial cells FEW FEW /lpf    Bacteria NEGATIVE  NEG /hpf    UA:UC IF INDICATED URINE CULTURE ORDERED (A) CNI      Mucus TRACE (A) NEG /lpf    Hyaline cast 0-2 0 - 5 /lpf   BILIRUBIN, CONFIRM    Collection Time: 08/15/17 12:50 PM   Result Value Ref Range    Bilirubin UA, confirm POSITIVE (A) NEG     CBC WITH AUTOMATED DIFF    Collection Time: 08/15/17  1:01 PM   Result Value Ref Range    WBC 10.7 4.1 - 11.1 K/uL    RBC 3.89 (L) 4.10 - 5.70 M/uL    HGB 13.6 12.1 - 17.0 g/dL    HCT 38.9 36.6 - 50.3 %    .0 (H) 80.0 - 99.0 FL    MCH 35.0 (H) 26.0 - 34.0 PG    MCHC 35.0 30.0 - 36.5 g/dL    RDW 13.0 11.5 - 14.5 %    PLATELET 144 861 - 600 K/uL    NEUTROPHILS 75 32 - 75 %    LYMPHOCYTES 12 12 - 49 %    MONOCYTES 6 5 - 13 %    EOSINOPHILS 6 0 - 7 %    BASOPHILS 1 0 - 1 %    ABS. NEUTROPHILS 8.1 (H) 1.8 - 8.0 K/UL    ABS. LYMPHOCYTES 1.3 0.8 - 3.5 K/UL    ABS. MONOCYTES 0.7 0.0 - 1.0 K/UL    ABS. EOSINOPHILS 0.6 (H) 0.0 - 0.4 K/UL    ABS.  BASOPHILS 0.1 0.0 - 0.1 K/UL   METABOLIC PANEL, COMPREHENSIVE    Collection Time: 08/15/17  1:01 PM   Result Value Ref Range    Sodium 137 136 - 145 mmol/L    Potassium 3.6 3.5 - 5.1 mmol/L    Chloride 102 97 - 108 mmol/L    CO2 26 21 - 32 mmol/L    Anion gap 9 5 - 15 mmol/L    Glucose 197 (H) 65 - 100 mg/dL    BUN 24 (H) 6 - 20 MG/DL    Creatinine 1.11 0.70 - 1.30 MG/DL    BUN/Creatinine ratio 22 (H) 12 - 20      GFR est AA >60 >60 ml/min/1.73m2    GFR est non-AA >60 >60 ml/min/1.73m2 Calcium 8.8 8.5 - 10.1 MG/DL    Bilirubin, total 0.5 0.2 - 1.0 MG/DL    ALT (SGPT) 67 12 - 78 U/L    AST (SGOT) 57 (H) 15 - 37 U/L    Alk.  phosphatase 131 (H) 45 - 117 U/L    Protein, total 8.1 6.4 - 8.2 g/dL    Albumin 3.6 3.5 - 5.0 g/dL    Globulin 4.5 (H) 2.0 - 4.0 g/dL    A-G Ratio 0.8 (L) 1.1 - 2.2     LIPASE    Collection Time: 08/15/17  1:01 PM   Result Value Ref Range    Lipase >3000 (H) 73 - 393 U/L

## 2017-08-16 NOTE — PROGRESS NOTES
Hospitalist Progress Note    NAME: Elvi Farmer   :  1945   MRN:  [id number]       Interim Hospital Summary: 74 y.o. male whom presented on 8/15/2017 with      Assessment / Plan:  Acute Hypoxic Respiratory Failure POA: O2 sat 86% at room airon admission  On treatement with nebs , oxygen , feeling better now    Acute COPD Exacerbation:   LVQ, bronchodilators, steroids, check sputum. Improving now    Acute Pancreatitis: with pancreas divisum not as a cause of his pancreatitis   , CBD seems not to be dilated and he is not a drinker,  NPO,    Lipase still over 3000  GI evaluation apreciated  Shall follow up on the results of the HIDA scan     UTI:    LVQ and F/U cultures. CAD:   no chest pain at this time  Not in failure  Continue the aspirin and plavix  . Hypothyroidism:   will c/w L-thyroxine    H/O Seizures   c/w Phenytoin  No hx of recent seizures  . Code Status: Full Code  Surrogate Decision Maker: wife [facility] [phone]  DVT Prophylaxis: Lovenox  GI Prophylaxis: Pepcid  Baseline: Fairly independent lives with wife in  [address]:     Chief Complaint / Reason for Physician Visit    Discussed with RN events overnight. Feels better with significant improvement in sob and abdominal pain , no more nausea or vomiting   No diarrhoea  Hungry     Review of Systems:  Symptom Y/N Comments  Symptom Y/N Comments   Fever/Chills n   Chest Pain nn    Poor Appetite    Edema n    Cough n   Abdominal Pain     Sputum n   Joint Pain n    SOB/PARSONS    Pruritis/Rash     Nausea/vomit n   Tolerating PT/OT     Diarrhea n   Tolerating Diet     Constipation    Other       Could NOT obtain due to:      Objective:     VITALS:   Last 24hrs VS reviewed since prior progress note.  Most recent are:  Patient Vitals for the past 24 hrs:   Temp Pulse Resp BP SpO2   17 1107 97.4 °F (36.3 °C) 74 18 97/52 95 %   17 0743 97.3 °F (36.3 °C) 87 18 101/54 90 % 08/16/17 0727 - - - - 94 %   08/16/17 0427 97.8 °F (36.6 °C) 90 18 121/72 93 %   08/16/17 0100 - - - - 94 %   08/15/17 2347 98.2 °F (36.8 °C) 86 18 110/74 91 %   08/15/17 2330 - 85 21 108/64 94 %   08/15/17 2315 - 87 18 97/78 92 %   08/15/17 2300 - 82 20 104/64 95 %   08/15/17 2245 - 83 22 110/64 96 %   08/15/17 2230 - 89 23 106/60 94 %   08/15/17 2201 - - - - 90 %   08/15/17 2200 - 94 19 112/66 -   08/15/17 2145 - 92 28 110/66 90 %   08/15/17 2130 - 90 16 106/58 94 %   08/15/17 2115 - 88 20 103/56 93 %   08/15/17 2101 - - - - 92 %   08/15/17 2100 - 96 22 115/57 -   08/15/17 2045 - 90 22 116/62 91 %   08/15/17 2030 - 90 23 116/64 93 %   08/15/17 2015 - 99 24 119/77 -   08/15/17 2014 - - - - 90 %   08/15/17 2000 - (!) 101 25 93/65 92 %   08/15/17 1946 - - - - 93 %   08/15/17 1945 - 95 22 110/57 -   08/15/17 1930 - 94 20 105/65 91 %   08/15/17 1800 - 92 21 110/85 93 %   08/15/17 1746 - 83 17 134/70 93 %   08/15/17 1730 - 88 20 120/65 92 %   08/15/17 1715 - 93 20 122/85 90 %   08/15/17 1703 - 83 21 118/70 90 %   08/15/17 1700 - 86 21 - 90 %   08/15/17 1600 - 92 25 104/65 (!) 86 %   08/15/17 1548 - - - - 91 %   08/15/17 1545 - 93 20 125/63 -   08/15/17 1533 - - - - 92 %   08/15/17 1530 - 86 20 123/72 -       Intake/Output Summary (Last 24 hours) at 08/16/17 1527  Last data filed at 08/16/17 1110   Gross per 24 hour   Intake              700 ml   Output              700 ml   Net                0 ml        PHYSICAL EXAM:  General: WD, WN. Alert, cooperative, no acute distress    EENT:  EOMI. Anicteric sclerae. MMM  Resp:  CTA bilaterally, no wheezing or rales. No accessory muscle use  CV:  Regular  rhythm,  No edema  GI:  Soft, Non distended, tender in the RUQ.  +Bowel sounds  Neurologic:  Alert and oriented X 3, normal speech,   Psych:   Good insight. Not anxious nor agitated  Skin:  No rashes.   No jaundice    Reviewed most current lab test results and cultures  YES  Reviewed most current radiology test results YES  Review and summation of old records today    NO  Reviewed patient's current orders and MAR    YES  PMH/SH reviewed - no change compared to H&P  ________________________________________________________________________  Care Plan discussed with:    Comments   Patient [address]     Consultant                        Multidiciplinary team rounds were held today with , nursing, pharmacist and clinical coordinator. Patient's plan of care was discussed; medications were reviewed and discharge planning was addressed. ________________________________________________________________________  Total NON critical care TIME:  35  Minutes    Total CRITICAL CARE TIME Spent:   Minutes non procedure based      Comments   >50% of visit spent in counseling and coordination of care     ________________________________________________________________________  Padmini Gonzalez MD     Procedures: see electronic medical records for all procedures/Xrays and details which were not copied into this note but were reviewed prior to creation of Plan. LABS:  I reviewed today's most current labs and imaging studies.   Pertinent labs include:  Recent Labs      08/16/17   0001  08/15/17   1301   WBC  9.8  10.7   HGB  12.0*  13.6   HCT  34.1*  38.9   PLT  130*  150     Recent Labs      08/16/17   0001  08/15/17   1301   NA  136  137   K  3.8  3.6   CL  108  102   CO2  22  26   GLU  145*  197*   BUN  17  24*   CREA  0.81  1.11   CA  7.6*  8.8   MG  2.1   --    ALB  2.9*  3.6   TBILI  0.4  0.5   SGOT  42*  57*   ALT  56  67

## 2017-08-16 NOTE — CONSULTS
Gastroenterology Consult     Referring Physician: Dr. Jairo Guzman  PCP: Dr. Lauren Hernández [facility])     Consult Date: 8/16/2017     Subjective:     Chief Complaint: abd pain    History of Present Illness: Meredith Davis is a 74 y.o. male who is seen in consultation for pancreatitits. Patient was in his usual state of health until a week ago when he had an abrupt onset of upper abd pain that radiated down into lower abd and through to the back. It was constant and 9/10. It was worse when he ate, so he stopped eating. He had one episode of N/V. He is unsure of fevers. He was taking ?nitrofurantoin for a UTI. Lipase>3000  Bili normal  WBC normal  Crt normal    CT abd/pelvis with contrast: LIVER: No mass or biliary dilatation. GALLBLADDER: Mild distention without any acute abnormality or calcified stone. Nonspecific epithelial thickening of the common bile duct. SPLEEN: Unremarkable  PANCREAS: No focal lesion. Mild pancreatic ductal prominence with probable  pancreatic divisum. No peripancreatic inflammation. U/S abd: FINDINGS:    The gallbladder is distended and contains trace sludge. No mobile shadowing gallstones are identified and no gallbladder wall thickening  is seen. The extrahepatic bile duct measures 0.5 cm which is normal in caliber. No personal or family history of pancreatitis. No ETOH. Father, brother and sister have all had their gallbladder removed. No weight loss. No other new medications other than nitrofurantoin. Pain is presently 2/10 and he is hungry and asking to eat. No nausea.       Past Medical History:   Diagnosis Date    CAD (coronary artery disease)     Other ill-defined conditions     marce kc, MI (1995), aortic aneurysm     Past Surgical History:   Procedure Laterality Date    ABDOMEN SURGERY PROC UNLISTED      hernia     CARDIAC SURG PROCEDURE UNLIST      stents placed    HX HEENT      cateracts removed, lens implants    HX OTHER SURGICAL      abdominal anuersym      Family History   Problem Relation Age of Onset    Elevated Lipids Mother     Heart Disease Mother     Asthma Brother     Cancer Brother     Diabetes Brother     Heart Disease Father      Social History   Substance Use Topics    Smoking status: Former Smoker     Packs/day: 0.50     Quit date: 8/15/2015    Smokeless tobacco: Never Used      Comment: e-cigarettes    Alcohol use No      No Known Allergies  Current Facility-Administered Medications   Medication Dose Route Frequency    levoFLOXacin (LEVAQUIN) 750 mg in D5W IVPB  750 mg IntraVENous Q24H    sodium chloride (NS) flush 5-10 mL  5-10 mL IntraVENous Q8H    sodium chloride (NS) flush 5-10 mL  5-10 mL IntraVENous PRN    aspirin chewable tablet 81 mg  81 mg Oral DAILY    clopidogrel (PLAVIX) tablet 75 mg  75 mg Oral DAILY    fluticasone-vilanterol (BREO ELLIPTA) 100mcg-25mcg/puff  1 Puff Inhalation DAILY    levothyroxine (SYNTHROID) tablet 75 mcg  75 mcg Oral ACB    phenytoin ER (DILANTIN ER) ER capsule 100 mg  100 mg Oral BID    tamsulosin (FLOMAX) capsule 0.4 mg  0.4 mg Oral DAILY    umeclidinium (INCRUSE ELLIPTA) 62.5 mcg/actuation  1 Puff Inhalation DAILY    0.9% sodium chloride infusion  75 mL/hr IntraVENous CONTINUOUS    famotidine (PF) (PEPCID) 20 mg in sodium chloride 0.9 % 10 mL injection  20 mg IntraVENous Q12H    morphine injection 2 mg  2 mg IntraVENous Q4H PRN    albuterol-ipratropium (DUO-NEB) 2.5 MG-0.5 MG/3 ML  3 mL Nebulization Q6H RT    methylPREDNISolone (PF) (SOLU-MEDROL) injection 60 mg  60 mg IntraVENous Q8H    enoxaparin (LOVENOX) injection 40 mg  40 mg SubCUTAneous Q24H    acetaminophen (TYLENOL) tablet 650 mg  650 mg Oral Q4H PRN    ondansetron (ZOFRAN) injection 4 mg  4 mg IntraVENous Q6H PRN        Review of Systems:  A detailed 10 organ review of systems is obtained with pertinent positives as listed in the History of Present Illness and Past Medical History.  A detailed review of systems was performed as follows:  Constitutional:  Negative  Eyes:  No ocular sensitivity to the sun, blurred vision or double vision. ENMT:  No nose or sinus problems. Respiratory: No coughing, wheezing +chronic sob (COPD)  Cardiac:  No chest pain, exertional chest pain or palpitations  Gastrointestinal:  See history of the present illness  :   No pain with urination or hematuria  Musculoskeletal:  +arthritis - hands. no hot swollen joints. Endocrine:  No thyroid disease or diabetes  Psychiatric: No depression or feeling blue  Integumentary:  No skin rash or sensitivity to the sun. Neurologic:  No stroke or seizure; no numbness or tingling of the extremities. Heme-Lymphatic:  No history of anemia, no unexplained lumps or bumps      Objective:     Physical Exam:  Visit Vitals    BP 97/52    Pulse 74    Temp 97.4 °F (36.3 °C)    Resp 18    Ht 5' 8\" (1.727 m)    Wt 60.6 kg (133 lb 9.6 oz)    SpO2 95%    BMI 20.31 kg/m2        Gen: NAD  Skin:  Extremities and face reveal no rashes. HEENT: Sclerae anicteric. No abnormal pigmentation of the lips. The neck is supple. Cardiovascular: Regular rate and rhythm. No murmurs, gallops, or rubs. Respiratory:  Comfortable breathing with no accessory muscle use. Clear breath sounds with no wheezes, rales, or rhonchi. GI:  Abdomen nondistended, soft. Normal active bowel sounds. There is tenderness in epigastrium. +Arthur's sign. No guarding or rebounding No enlargement of the liver or spleen. No masses palpable. Rectal:  Deferred  Musculoskeletal:  No pitting edema of the lower legs. Neurological:  Gross memory appears intact. Patient is alert and oriented. Psychiatric:  Mood appears appropriate with judgement intact. Lymphatic:  No cervical or supraclavicular adenopathy.     Lab/Data Review:  Lab Results   Component Value Date/Time    WBC 9.8 08/16/2017 12:01 AM    HGB 12.0 08/16/2017 12:01 AM    HCT 34.1 08/16/2017 12:01 AM    PLATELET 130 08/16/2017 12:01 AM    MCV 99.4 08/16/2017 12:01 AM     Lab Results   Component Value Date/Time    Sodium 136 08/16/2017 12:01 AM    Potassium 3.8 08/16/2017 12:01 AM    Chloride 108 08/16/2017 12:01 AM    CO2 22 08/16/2017 12:01 AM    Anion gap 6 08/16/2017 12:01 AM    Glucose 145 08/16/2017 12:01 AM    BUN 17 08/16/2017 12:01 AM    Creatinine 0.81 08/16/2017 12:01 AM    BUN/Creatinine ratio 21 08/16/2017 12:01 AM    GFR est AA >60 08/16/2017 12:01 AM    GFR est non-AA >60 08/16/2017 12:01 AM    Calcium 7.6 08/16/2017 12:01 AM    Bilirubin, total 0.4 08/16/2017 12:01 AM    AST (SGOT) 42 08/16/2017 12:01 AM    Alk. phosphatase 112 08/16/2017 12:01 AM    Protein, total 7.0 08/16/2017 12:01 AM    Albumin 2.9 08/16/2017 12:01 AM    Globulin 4.1 08/16/2017 12:01 AM    A-G Ratio 0.7 08/16/2017 12:01 AM    ALT (SGPT) 56 08/16/2017 12:01 AM     Lab Results   Component Value Date/Time    Lipase >3000 08/16/2017 12:01 AM     CT Results (most recent):    Results from Hospital Encounter encounter on 08/15/17   CT ABD PELV W CONT   Narrative INDICATION: Periumbilical abdominal pain radiating to the back with anorexia for  4 days    COMPARISON: None available    TECHNIQUE:   Thin axial images were obtained through the abdomen and pelvis following  intravenous iodinated contrast administration. Coronal and sagittal  reconstructions were generated. Oral contrast was administered. CT dose  reduction was achieved through use of a standardized protocol tailored for this  examination and automatic exposure control for dose modulation. FINDINGS:     LIVER: No mass or biliary dilatation. GALLBLADDER: Mild distention without any acute abnormality or calcified stone. Nonspecific epithelial thickening of the common bile duct. SPLEEN: Unremarkable  PANCREAS: No focal lesion. Mild pancreatic ductal prominence with probable  pancreatic divisum. No peripancreatic inflammation.   ADRENALS: Mild nonspecific thickening without focal abnormality  KIDNEYS/URETERS: Normal symmetric nephrograms the few low-density lesions too  small to characterize. No other focal renal abnormality. Bilateral vascular  calcifications. No hydronephrosis or hydroureter. PERITONEUM: No abdominal lymphadenopathy or ascites. Sequelae of abdominal  aortic aneurysm graft repair is seen. No retroperitoneal infiltration or  hemorrhage. COLON: No dilatation or wall thickening. APPENDIX: Unremarkable. SMALL BOWEL: No dilatation or wall thickening. STOMACH: Unremarkable. PELVIS: No pelvic lymphadenopathy or free fluid. Aneurysmal dilatation of the  right internal iliac artery measuring 2 cm. The bladder is unremarkable. BONES: No destructive bone lesion. VISUALIZED THORAX: Mild to moderate emphysematous changes in the lungs with  lingular atelectasis and/or scarring  ADDITIONAL COMMENTS: N/A         Impression IMPRESSION:    No definite acute abnormality. Moderate distention of the gallbladder without  inflammation or calcified stone. Nonspecific epithelial thickening of the common  bile duct. Probable pancreatic divisum without any inflammatory changes of the  pancreas. There is also mild pancreatic ductal prominence. Sequelae of abdominal aortic endograft repair without retroperitoneal  hemorrhage. Intact right internal iliac artery aneurysmal dilatation measuring 2  cm. Mild to moderate emphysematous changes in the lungs. US Results (most recent):    Results from [place] encounter on 08/15/17   US ABD LTD   Narrative INDICATION:  Pancreatitis. COMPARISON:  CT scan earlier same day. TECHNIQUE:    Multiple sonographic real time images were obtained of the right upper abdomen. FINDINGS:    The gallbladder is distended and contains trace sludge. No mobile shadowing gallstones are identified and no gallbladder wall thickening  is seen. The extrahepatic bile duct measures 0.5 cm which is normal in caliber.   The visualized portions of the liver are unremarkable. Flow in the portal vein is appropriate towards the liver. The pancreas is obscured by bowel gas. The right kidney measures 10.3 cm and shows no hydronephrosis. Impression IMPRESSION:    Prominent distention of the gallbladder which contains tiny amount of sludge  with no mobile shadowing gallstones identified. No biliary ductal dilatation. No right hydronephrosis. Assessment/Plan:     Active Problems:    Acute pancreatitis (8/15/2017)      COPD exacerbation ([address] (8/15/2017)      UTI (urinary tract infection) (8/15/2017)      CAD (coronary artery disease) (8/15/2017)      Acquired hypothyroidism (8/15/2017)      S/P AAA repair (8/15/2017)         Patient has a distended gallbladder with sludge and a strong FH of gallbladder disease. I recommend a HIDA scan to assess for acute cholecystitis. If normal, advance diet to clear liquids as his pain has improved significantly and he is hungry. His LFTs are normal and CBD is normal diameter so I do not suspect a CBD stone. He may have pancreatic divisum but this does not necessarily cause pancreatitis, and he likely would had an episode of pancreatitis prior to age 74 if that was the cause. He should have an outpatient EUS of the pancreas to rule out occult malignancy in 6 weeks once the inflammation calms down.         HERON Demarco  08/16/17  12:16 PM

## 2017-08-17 ENCOUNTER — ANESTHESIA (OUTPATIENT)
Dept: SURGERY | Age: 72
DRG: 417 | End: 2017-08-17
Payer: MEDICARE

## 2017-08-17 ENCOUNTER — APPOINTMENT (OUTPATIENT)
Dept: GENERAL RADIOLOGY | Age: 72
DRG: 417 | End: 2017-08-17
Attending: SURGERY
Payer: MEDICARE

## 2017-08-17 ENCOUNTER — ANESTHESIA EVENT (OUTPATIENT)
Dept: SURGERY | Age: 72
DRG: 417 | End: 2017-08-17
Payer: MEDICARE

## 2017-08-17 LAB
ATRIAL RATE: 85 BPM
BACTERIA SPEC CULT: ABNORMAL
CALCULATED P AXIS, ECG09: 76 DEGREES
CALCULATED R AXIS, ECG10: 14 DEGREES
CALCULATED T AXIS, ECG11: 42 DEGREES
CC UR VC: ABNORMAL
DIAGNOSIS, 93000: NORMAL
LIPASE SERPL-CCNC: 243 U/L (ref 73–393)
P-R INTERVAL, ECG05: 150 MS
Q-T INTERVAL, ECG07: 412 MS
QRS DURATION, ECG06: 88 MS
QTC CALCULATION (BEZET), ECG08: 490 MS
SERVICE CMNT-IMP: ABNORMAL
VENTRICULAR RATE, ECG03: 85 BPM

## 2017-08-17 PROCEDURE — 77030020782 HC GWN BAIR PAWS FLX 3M -B

## 2017-08-17 PROCEDURE — 88307 TISSUE EXAM BY PATHOLOGIST: CPT | Performed by: SURGERY

## 2017-08-17 PROCEDURE — 88304 TISSUE EXAM BY PATHOLOGIST: CPT | Performed by: SURGERY

## 2017-08-17 PROCEDURE — 74011250637 HC RX REV CODE- 250/637: Performed by: SURGERY

## 2017-08-17 PROCEDURE — 88313 SPECIAL STAINS GROUP 2: CPT | Performed by: SURGERY

## 2017-08-17 PROCEDURE — 74011250637 HC RX REV CODE- 250/637: Performed by: INTERNAL MEDICINE

## 2017-08-17 PROCEDURE — 93005 ELECTROCARDIOGRAM TRACING: CPT

## 2017-08-17 PROCEDURE — 74011250636 HC RX REV CODE- 250/636: Performed by: INTERNAL MEDICINE

## 2017-08-17 PROCEDURE — 77030011640 HC PAD GRND REM COVD -A: Performed by: SURGERY

## 2017-08-17 PROCEDURE — 77030008771 HC TU NG SALEM SUMP -A: Performed by: ANESTHESIOLOGY

## 2017-08-17 PROCEDURE — 77030032490 HC SLV COMPR SCD KNE COVD -B: Performed by: SURGERY

## 2017-08-17 PROCEDURE — 74011250636 HC RX REV CODE- 250/636

## 2017-08-17 PROCEDURE — 77030003481 HC NDL BIOP GUN BARD -B: Performed by: SURGERY

## 2017-08-17 PROCEDURE — 77030008771 HC TU NG SALEM SUMP -A: Performed by: SURGERY

## 2017-08-17 PROCEDURE — 0FT44ZZ RESECTION OF GALLBLADDER, PERCUTANEOUS ENDOSCOPIC APPROACH: ICD-10-PCS | Performed by: SURGERY

## 2017-08-17 PROCEDURE — 77030035220 HC TRCR ENDOSC BLNTPRT ANCHR COVD -B: Performed by: SURGERY

## 2017-08-17 PROCEDURE — BF121ZZ FLUOROSCOPY OF GALLBLADDER USING LOW OSMOLAR CONTRAST: ICD-10-PCS | Performed by: SURGERY

## 2017-08-17 PROCEDURE — 74011636320 HC RX REV CODE- 636/320: Performed by: SURGERY

## 2017-08-17 PROCEDURE — 74011000250 HC RX REV CODE- 250: Performed by: SURGERY

## 2017-08-17 PROCEDURE — 74300 X-RAY BILE DUCTS/PANCREAS: CPT

## 2017-08-17 PROCEDURE — 76010000149 HC OR TIME 1 TO 1.5 HR: Performed by: SURGERY

## 2017-08-17 PROCEDURE — 74011000250 HC RX REV CODE- 250: Performed by: INTERNAL MEDICINE

## 2017-08-17 PROCEDURE — 74011000250 HC RX REV CODE- 250

## 2017-08-17 PROCEDURE — 77030026438 HC STYL ET INTUB CARD -A: Performed by: ANESTHESIOLOGY

## 2017-08-17 PROCEDURE — 76060000033 HC ANESTHESIA 1 TO 1.5 HR: Performed by: SURGERY

## 2017-08-17 PROCEDURE — 65660000000 HC RM CCU STEPDOWN

## 2017-08-17 PROCEDURE — C1758 CATHETER, URETERAL: HCPCS | Performed by: SURGERY

## 2017-08-17 PROCEDURE — 77030008756 HC TU IRR SUC STRY -B: Performed by: SURGERY

## 2017-08-17 PROCEDURE — 36415 COLL VENOUS BLD VENIPUNCTURE: CPT | Performed by: INTERNAL MEDICINE

## 2017-08-17 PROCEDURE — 77030018836 HC SOL IRR NACL ICUM -A: Performed by: SURGERY

## 2017-08-17 PROCEDURE — 76210000000 HC OR PH I REC 2 TO 2.5 HR: Performed by: SURGERY

## 2017-08-17 PROCEDURE — 77030008684 HC TU ET CUF COVD -B: Performed by: ANESTHESIOLOGY

## 2017-08-17 PROCEDURE — 74011250636 HC RX REV CODE- 250/636: Performed by: ANESTHESIOLOGY

## 2017-08-17 PROCEDURE — 77030031139 HC SUT VCRL2 J&J -A: Performed by: SURGERY

## 2017-08-17 PROCEDURE — 77010033678 HC OXYGEN DAILY

## 2017-08-17 PROCEDURE — 77030010513 HC APPL CLP LIG J&J -C: Performed by: SURGERY

## 2017-08-17 PROCEDURE — 83690 ASSAY OF LIPASE: CPT | Performed by: INTERNAL MEDICINE

## 2017-08-17 PROCEDURE — 77030010507 HC ADH SKN DERMBND J&J -B: Performed by: SURGERY

## 2017-08-17 PROCEDURE — 94640 AIRWAY INHALATION TREATMENT: CPT

## 2017-08-17 PROCEDURE — 77030019908 HC STETH ESOPH SIMS -A: Performed by: ANESTHESIOLOGY

## 2017-08-17 RX ORDER — CEFAZOLIN SODIUM 1 G/3ML
INJECTION, POWDER, FOR SOLUTION INTRAMUSCULAR; INTRAVENOUS AS NEEDED
Status: DISCONTINUED | OUTPATIENT
Start: 2017-08-17 | End: 2017-08-17 | Stop reason: HOSPADM

## 2017-08-17 RX ORDER — ONDANSETRON 2 MG/ML
INJECTION INTRAMUSCULAR; INTRAVENOUS AS NEEDED
Status: DISCONTINUED | OUTPATIENT
Start: 2017-08-17 | End: 2017-08-17 | Stop reason: HOSPADM

## 2017-08-17 RX ORDER — FENTANYL CITRATE 50 UG/ML
INJECTION, SOLUTION INTRAMUSCULAR; INTRAVENOUS
Status: COMPLETED
Start: 2017-08-17 | End: 2017-08-17

## 2017-08-17 RX ORDER — GLYCOPYRROLATE 0.2 MG/ML
INJECTION INTRAMUSCULAR; INTRAVENOUS AS NEEDED
Status: DISCONTINUED | OUTPATIENT
Start: 2017-08-17 | End: 2017-08-17 | Stop reason: HOSPADM

## 2017-08-17 RX ORDER — LEVOFLOXACIN 750 MG/1
750 TABLET ORAL EVERY 24 HOURS
Status: DISCONTINUED | OUTPATIENT
Start: 2017-08-17 | End: 2017-08-18 | Stop reason: HOSPADM

## 2017-08-17 RX ORDER — BUPIVACAINE HYDROCHLORIDE AND EPINEPHRINE 5; 5 MG/ML; UG/ML
INJECTION, SOLUTION EPIDURAL; INTRACAUDAL; PERINEURAL AS NEEDED
Status: DISCONTINUED | OUTPATIENT
Start: 2017-08-17 | End: 2017-08-17 | Stop reason: HOSPADM

## 2017-08-17 RX ORDER — ETOMIDATE 2 MG/ML
INJECTION INTRAVENOUS AS NEEDED
Status: DISCONTINUED | OUTPATIENT
Start: 2017-08-17 | End: 2017-08-17 | Stop reason: HOSPADM

## 2017-08-17 RX ORDER — SODIUM CHLORIDE, SODIUM LACTATE, POTASSIUM CHLORIDE, CALCIUM CHLORIDE 600; 310; 30; 20 MG/100ML; MG/100ML; MG/100ML; MG/100ML
25 INJECTION, SOLUTION INTRAVENOUS CONTINUOUS
Status: DISCONTINUED | OUTPATIENT
Start: 2017-08-17 | End: 2017-08-17 | Stop reason: HOSPADM

## 2017-08-17 RX ORDER — ACETAMINOPHEN 10 MG/ML
INJECTION, SOLUTION INTRAVENOUS AS NEEDED
Status: DISCONTINUED | OUTPATIENT
Start: 2017-08-17 | End: 2017-08-17 | Stop reason: HOSPADM

## 2017-08-17 RX ORDER — HYDROMORPHONE HYDROCHLORIDE 2 MG/ML
INJECTION, SOLUTION INTRAMUSCULAR; INTRAVENOUS; SUBCUTANEOUS AS NEEDED
Status: DISCONTINUED | OUTPATIENT
Start: 2017-08-17 | End: 2017-08-17 | Stop reason: HOSPADM

## 2017-08-17 RX ORDER — SODIUM CHLORIDE 0.9 % (FLUSH) 0.9 %
5-10 SYRINGE (ML) INJECTION EVERY 8 HOURS
Status: DISCONTINUED | OUTPATIENT
Start: 2017-08-17 | End: 2017-08-17 | Stop reason: HOSPADM

## 2017-08-17 RX ORDER — NEOSTIGMINE METHYLSULFATE 1 MG/ML
INJECTION INTRAVENOUS AS NEEDED
Status: DISCONTINUED | OUTPATIENT
Start: 2017-08-17 | End: 2017-08-17 | Stop reason: HOSPADM

## 2017-08-17 RX ORDER — DEXAMETHASONE SODIUM PHOSPHATE 4 MG/ML
INJECTION, SOLUTION INTRA-ARTICULAR; INTRALESIONAL; INTRAMUSCULAR; INTRAVENOUS; SOFT TISSUE AS NEEDED
Status: DISCONTINUED | OUTPATIENT
Start: 2017-08-17 | End: 2017-08-17 | Stop reason: HOSPADM

## 2017-08-17 RX ORDER — ROCURONIUM BROMIDE 10 MG/ML
INJECTION, SOLUTION INTRAVENOUS AS NEEDED
Status: DISCONTINUED | OUTPATIENT
Start: 2017-08-17 | End: 2017-08-17 | Stop reason: HOSPADM

## 2017-08-17 RX ORDER — LIDOCAINE HYDROCHLORIDE 20 MG/ML
INJECTION, SOLUTION EPIDURAL; INFILTRATION; INTRACAUDAL; PERINEURAL AS NEEDED
Status: DISCONTINUED | OUTPATIENT
Start: 2017-08-17 | End: 2017-08-17 | Stop reason: HOSPADM

## 2017-08-17 RX ORDER — SODIUM CHLORIDE 0.9 % (FLUSH) 0.9 %
5-10 SYRINGE (ML) INJECTION AS NEEDED
Status: DISCONTINUED | OUTPATIENT
Start: 2017-08-17 | End: 2017-08-17 | Stop reason: HOSPADM

## 2017-08-17 RX ORDER — OXYCODONE AND ACETAMINOPHEN 5; 325 MG/1; MG/1
1 TABLET ORAL
Status: DISCONTINUED | OUTPATIENT
Start: 2017-08-17 | End: 2017-08-18 | Stop reason: HOSPADM

## 2017-08-17 RX ORDER — LIDOCAINE HYDROCHLORIDE 10 MG/ML
0.1 INJECTION, SOLUTION EPIDURAL; INFILTRATION; INTRACAUDAL; PERINEURAL AS NEEDED
Status: DISCONTINUED | OUTPATIENT
Start: 2017-08-17 | End: 2017-08-17 | Stop reason: HOSPADM

## 2017-08-17 RX ORDER — HYDROMORPHONE HYDROCHLORIDE 1 MG/ML
0.2 INJECTION, SOLUTION INTRAMUSCULAR; INTRAVENOUS; SUBCUTANEOUS
Status: DISCONTINUED | OUTPATIENT
Start: 2017-08-17 | End: 2017-08-17 | Stop reason: HOSPADM

## 2017-08-17 RX ORDER — PROPOFOL 10 MG/ML
INJECTION, EMULSION INTRAVENOUS AS NEEDED
Status: DISCONTINUED | OUTPATIENT
Start: 2017-08-17 | End: 2017-08-17 | Stop reason: HOSPADM

## 2017-08-17 RX ORDER — DIPHENHYDRAMINE HYDROCHLORIDE 50 MG/ML
12.5 INJECTION, SOLUTION INTRAMUSCULAR; INTRAVENOUS AS NEEDED
Status: DISCONTINUED | OUTPATIENT
Start: 2017-08-17 | End: 2017-08-17 | Stop reason: HOSPADM

## 2017-08-17 RX ORDER — SODIUM CHLORIDE, SODIUM LACTATE, POTASSIUM CHLORIDE, CALCIUM CHLORIDE 600; 310; 30; 20 MG/100ML; MG/100ML; MG/100ML; MG/100ML
150 INJECTION, SOLUTION INTRAVENOUS CONTINUOUS
Status: DISCONTINUED | OUTPATIENT
Start: 2017-08-17 | End: 2017-08-17 | Stop reason: HOSPADM

## 2017-08-17 RX ORDER — FENTANYL CITRATE 50 UG/ML
25 INJECTION, SOLUTION INTRAMUSCULAR; INTRAVENOUS
Status: DISCONTINUED | OUTPATIENT
Start: 2017-08-17 | End: 2017-08-17 | Stop reason: HOSPADM

## 2017-08-17 RX ORDER — FENTANYL CITRATE 50 UG/ML
INJECTION, SOLUTION INTRAMUSCULAR; INTRAVENOUS AS NEEDED
Status: DISCONTINUED | OUTPATIENT
Start: 2017-08-17 | End: 2017-08-17 | Stop reason: HOSPADM

## 2017-08-17 RX ORDER — MIDAZOLAM HYDROCHLORIDE 1 MG/ML
INJECTION, SOLUTION INTRAMUSCULAR; INTRAVENOUS AS NEEDED
Status: DISCONTINUED | OUTPATIENT
Start: 2017-08-17 | End: 2017-08-17 | Stop reason: HOSPADM

## 2017-08-17 RX ADMIN — FENTANYL CITRATE 25 MCG: 50 INJECTION, SOLUTION INTRAMUSCULAR; INTRAVENOUS at 17:03

## 2017-08-17 RX ADMIN — Medication 10 ML: at 21:59

## 2017-08-17 RX ADMIN — PROPOFOL 50 MG: 10 INJECTION, EMULSION INTRAVENOUS at 15:31

## 2017-08-17 RX ADMIN — FAMOTIDINE 20 MG: 10 INJECTION, SOLUTION INTRAVENOUS at 09:24

## 2017-08-17 RX ADMIN — HYDROMORPHONE HYDROCHLORIDE 0.5 MG: 2 INJECTION, SOLUTION INTRAMUSCULAR; INTRAVENOUS; SUBCUTANEOUS at 16:08

## 2017-08-17 RX ADMIN — FENTANYL CITRATE 25 MCG: 50 INJECTION, SOLUTION INTRAMUSCULAR; INTRAVENOUS at 18:00

## 2017-08-17 RX ADMIN — IPRATROPIUM BROMIDE AND ALBUTEROL SULFATE 3 ML: .5; 3 SOLUTION RESPIRATORY (INHALATION) at 01:45

## 2017-08-17 RX ADMIN — ONDANSETRON 4 MG: 2 INJECTION INTRAMUSCULAR; INTRAVENOUS at 15:56

## 2017-08-17 RX ADMIN — FENTANYL CITRATE 25 MCG: 50 INJECTION, SOLUTION INTRAMUSCULAR; INTRAVENOUS at 17:25

## 2017-08-17 RX ADMIN — METHYLPREDNISOLONE SODIUM SUCCINATE 60 MG: 125 INJECTION, POWDER, FOR SOLUTION INTRAMUSCULAR; INTRAVENOUS at 21:59

## 2017-08-17 RX ADMIN — FLUTICASONE FUROATE AND VILANTEROL TRIFENATATE 1 PUFF: 100; 25 POWDER RESPIRATORY (INHALATION) at 09:26

## 2017-08-17 RX ADMIN — OXYCODONE AND ACETAMINOPHEN 1 TABLET: 5; 325 TABLET ORAL at 20:05

## 2017-08-17 RX ADMIN — ROCURONIUM BROMIDE 40 MG: 10 INJECTION, SOLUTION INTRAVENOUS at 15:26

## 2017-08-17 RX ADMIN — NEOSTIGMINE METHYLSULFATE 3 MG: 1 INJECTION INTRAVENOUS at 16:19

## 2017-08-17 RX ADMIN — FENTANYL CITRATE 100 MCG: 50 INJECTION, SOLUTION INTRAMUSCULAR; INTRAVENOUS at 15:26

## 2017-08-17 RX ADMIN — IPRATROPIUM BROMIDE AND ALBUTEROL SULFATE 3 ML: .5; 3 SOLUTION RESPIRATORY (INHALATION) at 07:48

## 2017-08-17 RX ADMIN — UMECLIDINIUM 1 PUFF: 62.5 AEROSOL, POWDER ORAL at 09:26

## 2017-08-17 RX ADMIN — GLYCOPYRROLATE 0.5 MG: 0.2 INJECTION INTRAMUSCULAR; INTRAVENOUS at 16:19

## 2017-08-17 RX ADMIN — METHYLPREDNISOLONE SODIUM SUCCINATE 60 MG: 125 INJECTION, POWDER, FOR SOLUTION INTRAMUSCULAR; INTRAVENOUS at 06:04

## 2017-08-17 RX ADMIN — ACETAMINOPHEN 1000 MG: 10 INJECTION, SOLUTION INTRAVENOUS at 15:45

## 2017-08-17 RX ADMIN — TAMSULOSIN HYDROCHLORIDE 0.4 MG: 0.4 CAPSULE ORAL at 20:44

## 2017-08-17 RX ADMIN — PHENYTOIN SODIUM 200 MG: 100 CAPSULE ORAL at 19:12

## 2017-08-17 RX ADMIN — LEVOTHYROXINE SODIUM 75 MCG: 75 TABLET ORAL at 09:23

## 2017-08-17 RX ADMIN — IPRATROPIUM BROMIDE AND ALBUTEROL SULFATE 3 ML: .5; 3 SOLUTION RESPIRATORY (INHALATION) at 13:07

## 2017-08-17 RX ADMIN — ETOMIDATE 20 MG: 2 INJECTION INTRAVENOUS at 15:26

## 2017-08-17 RX ADMIN — Medication 10 ML: at 06:04

## 2017-08-17 RX ADMIN — LEVOFLOXACIN 750 MG: 5 INJECTION, SOLUTION INTRAVENOUS at 01:44

## 2017-08-17 RX ADMIN — FAMOTIDINE 20 MG: 10 INJECTION, SOLUTION INTRAVENOUS at 20:46

## 2017-08-17 RX ADMIN — IPRATROPIUM BROMIDE AND ALBUTEROL SULFATE 3 ML: .5; 3 SOLUTION RESPIRATORY (INHALATION) at 20:07

## 2017-08-17 RX ADMIN — DEXAMETHASONE SODIUM PHOSPHATE 10 MG: 4 INJECTION, SOLUTION INTRA-ARTICULAR; INTRALESIONAL; INTRAMUSCULAR; INTRAVENOUS; SOFT TISSUE at 15:45

## 2017-08-17 RX ADMIN — FENTANYL CITRATE 25 MCG: 50 INJECTION, SOLUTION INTRAMUSCULAR; INTRAVENOUS at 17:13

## 2017-08-17 RX ADMIN — Medication 10 ML: at 13:26

## 2017-08-17 RX ADMIN — ROCURONIUM BROMIDE 10 MG: 10 INJECTION, SOLUTION INTRAVENOUS at 15:40

## 2017-08-17 RX ADMIN — METHYLPREDNISOLONE SODIUM SUCCINATE 60 MG: 125 INJECTION, POWDER, FOR SOLUTION INTRAMUSCULAR; INTRAVENOUS at 13:26

## 2017-08-17 RX ADMIN — PHENYTOIN SODIUM 100 MG: 100 CAPSULE ORAL at 09:23

## 2017-08-17 RX ADMIN — LIDOCAINE HYDROCHLORIDE 80 MG: 20 INJECTION, SOLUTION EPIDURAL; INFILTRATION; INTRACAUDAL; PERINEURAL at 15:26

## 2017-08-17 RX ADMIN — SODIUM CHLORIDE, SODIUM LACTATE, POTASSIUM CHLORIDE, AND CALCIUM CHLORIDE 150 ML/HR: 600; 310; 30; 20 INJECTION, SOLUTION INTRAVENOUS at 14:24

## 2017-08-17 RX ADMIN — LEVOFLOXACIN 750 MG: 750 TABLET, FILM COATED ORAL at 22:00

## 2017-08-17 RX ADMIN — MORPHINE SULFATE 2 MG: 2 INJECTION, SOLUTION INTRAMUSCULAR; INTRAVENOUS at 20:47

## 2017-08-17 RX ADMIN — MIDAZOLAM HYDROCHLORIDE 0.5 MG: 1 INJECTION, SOLUTION INTRAMUSCULAR; INTRAVENOUS at 15:16

## 2017-08-17 RX ADMIN — CEFAZOLIN SODIUM 2 G: 1 INJECTION, POWDER, FOR SOLUTION INTRAMUSCULAR; INTRAVENOUS at 15:30

## 2017-08-17 NOTE — PROGRESS NOTES
ADULT PROTOCOL: JET AEROSOL  REASSESSMENT    Patient  Maida Conklin     74 y.o.   male     8/17/2017  2:02 AM    Breath Sounds Pre Procedure: Right Breath Sounds: Diminished                               Left Breath Sounds: Diminished    Breath Sounds Post Procedure: Right Breath Sounds: Diminished                                 Left Breath Sounds: Diminished    Breathing pattern: Pre procedure Breathing Pattern: Regular          Post procedure Breathing Pattern: Regular    Heart Rate: Pre procedure Pulse: 101           Post procedure Pulse: 99    Resp Rate: Pre procedure Respirations: 16           Post procedure Respirations: 16    Peak Flow: Pre bronchodilator   n/a          Post bronchodilator   n/a    FVC/FEV1: n/a    Incentive Spirometry:   n/ae          Cough: Pre procedure Cough: Non-productive               Post procedure Cough: Non-productive    Suctioned: NO    Sputum: Pre procedure  none                 Post procedure  none    Oxygen: O2 Device: Room air   room air     Changed: NO    SpO2: Pre procedure SpO2: 95 %   without oxygen              Post procedure SpO2: 95 %  without oxygen    Nebulizer Therapy: Current medications Aerosolized Medications: DuoNeb      Changed: no    Smoking History: quit 10-12 yr ago    Problem List:   Patient Active Problem List   Diagnosis Code    Acute pancreatitis K85.90    COPD exacerbation (HCC) J44.1    UTI (urinary tract infection) N39.0    CAD (coronary artery disease) I25.10    Acquired hypothyroidism E03.9    S/P AAA repair [id number], Z86.79       Respiratory Therapist: Liyah Lamb RT

## 2017-08-17 NOTE — PROGRESS NOTES
Bedside shift change report given to Tyler Garibay (oncoming nurse) by Scotty Cobian (offgoing nurse). Report included the following information SBAR, Kardex, Intake/Output, MAR and Recent Results. Zone Phone for oncoming shift:   [phone]    Shift Summary: Pt rested quietly through night. No c/o pain. LDAs               Peripheral IV 08/17/17 Right Antecubital (Active)   Site Assessment Clean, dry, & intact 8/17/2017  2:20 AM   Phlebitis Assessment 0 8/17/2017  2:20 AM   Infiltration Assessment 0 8/17/2017  2:20 AM   Dressing Status Clean, dry, & intact 8/17/2017  2:20 AM   Dressing Type Transparent;Tape 8/17/2017  2:20 AM   Hub Color/Line Status Pink; Infusing 8/17/2017  2:20 AM   Action Taken Blood drawn 8/17/2017  2:20 AM                        Intake & Output   Date 08/16/17 0700 - 08/17/17 0659 08/17/17 0700 - 08/18/17 0659   Shift 0507-8979 4777-0835 24 Hour Total 9684-1826 7712-2421 24 Hour Total   I  N  T  A  K  E   P. O. 0  0         P. O. 0  0       I.V.  (mL/kg/hr) 700  (1) 1950  (2.7) 2650  (1.8)         I.V. 700 1050 1750         Volume (0.9% sodium chloride infusion)  900 900       Shift Total  (mL/kg) 700  (11.6) 1950  (32.2) 2650  (43.7)      O  U  T  P  U  T   Urine  (mL/kg/hr)            Urine Occurrence(s)  4 x 4 x       Stool            Stool Occurrence(s) 1 x 1 x 2 x       Shift Total  (mL/kg)          1950 2650      Weight (kg) 60.6 60.6 60.6 60.6 60.6 60.6      Last Bowel Movement Last Bowel Movement Date: 08/16/17   Glucose Checks [x] N/A  [] AC/HS  [] Q6  Concerns:   Nutrition Active Orders   Diet    DIET NPO Except Meds       Consults []PT  []OT  []Speech  [x]Case Management   Cardiac Monitoring []N/A [x]Yes Expires:48 hrs

## 2017-08-17 NOTE — PERIOP NOTES
1038:  TRANSFER - IN REPORT:    Verbal report received from Laila Michael, [facility] on [employer]  being received from [phone] for ordered procedure. Estimated time for  given as 7250     Report consisted of patients Situation, Background, Assessment and   Recommendations(SBAR). Information from the following report(s) SBAR, Kardex, Intake/Output, MAR and Recent Results was reviewed with the receiving nurse. Opportunity for questions and clarification was provided. Assessment completed upon patients arrival to unit and care assumed.

## 2017-08-17 NOTE — OP NOTES
Operative Note/Laparoscopic Cholecystectomy      Patient ID:   Name: Gary Rincon   Medical Record Number: [id number]   YOB: 1945            OPERATIVE REPORT      PREOPERATIVE DIAGNOSIS:   1.  Gallstone pacreatitis    POSTOPERATIVE DIAGNOSIS  1. Gallstone pancreatitis  2. Sclerotic liver    OPERATIVE PROCEDURE:   1. Laparoscopic cholecystectomy with intraoperative cholangiogram    SURGEON: Belkis Dunlap. Prem Lees MD    ANESTHESIA: General.      COMPLICATIONS:   None    SPECIMENS:  1.  Gallbladder  2.  liver    FINDINGS:  1.  moderately diseased gallbladder  2. Sclerotic liver  3. Normal Intraoperative cholangiogram  4.  mild adhesions    ESTIMATED BLOOD LOSS: 25 mL. BRIEF HISTORY: The patient is a 74 y.o. yo male with recent biliary pancreatitis for cholecystectomy. The patient understood the risks and benefits  of laparoscopic cholecystectomy with possible cholangiogram including bleeding,  infection, biliary injury, bowel injury, post cholecystectomy diarrhea, and  residual stones, post operative respiratory and cardiac complications, DVT, and wishes to proceed. PROCEDURE: The patient was taken to the operating room, placed on  the operating table in the supine position and underwent general anesthesia. Afterward, the abdomen was prepped and  draped in the usual sterile fashion. After appropriate time-out 0.5%  Marcaine with epinephrine was infiltrated in the skin and subcutaneous  tissues in the periumbilical region. A curvilinear incision was made above  the umbilicus, and subcutaneous tissue dissected off bluntly. Electrocautery was used to go through midline of the fascia, and a 0 Vicryl  stay suture was placed on either side of the midline. The peritoneal cavity  was cautiously entered, and a blunt 12-mm Coy trocar was inserted in, CO2  insufflation begun, and 15 mmHg pressure gave good visualization of the  peritoneal cavity.  Two 5-mm trocars were placed in the upper abdomen. The  liver had sclerotic changes, and the gallbladder was moderately diseased with adhesions around it. The remaining abdominal compartment was grossly without unusual findings. The infundibulum was pulled up superior-laterally and the cystic duct  dissected out. A clip was placed at the cystic duct-infundibular junction  and a partial ductotomy performed. A cholangiogram was obtained giving good filling of the cystic duct and intra and extrahepatic ducts. There were no strictures or filling defects and contrast flowed freely into the duodenum. Two clips were placed proximally on the cystic duct and the cystic duct was divided. The cystic artery was dissected  out and 2 clips placed proximally and 1 distally, and the cystic artery was divided. Then utilizing the electrocautery,  the gallbladder was removed from the liver bed. The gallbladder was brought  out the umbilical trocar site. Reinsufflation begun. A small amount of  bleeding on the liver bed was controlled with electrocautery, and  irrigation and suctioning performed. A BARD trucut biopsy device was used to take 3 cores from the liver and cautery was used to control the bleeding. Trocars were removed and there was no apparent bleeding internally from the trocar sites. CO2 was allowed to be evacuated from the abdominal cavity. Interrupted 0-Vicryl was used to approximate the fascia at the umbilical trocar site. Running 4-0 Vicryl used to close skin on all the incisions and a dermabond dressing was placed. Upon completion of the procedure, the needle, sponge and instrument  counts were correct x2. The patient was extubated and brought to the recovery room. The patient tolerated the procedure well.     Jaime Lebron MD

## 2017-08-17 NOTE — PROGRESS NOTES
Hospitalist Progress Note    NAME: Jinny Mo   :  1945   MRN:  [id number]       Interim Hospital Summary: 74 y.o. male whom presented on 8/15/2017 with      Assessment / Plan:  Acute Hypoxic Respiratory Failure POA: O2 sat 86% at room airon admission  On treatement with nebs , oxygen , feeling better now     Acute COPD Exacerbation:   LVQ, bronchodilators, steroids, check sputum. Improving now     Acute Pancreatitis: with pancreas divisum not as a cause of his pancreatitis   , CBD seems not to be dilated and he is not a drinker,  NPO,    Lipase still over 3000  GI evaluation apreciated  HIDA scan suggestive of a diseased gall bladder, patient will be takento the OR for lap cholecystectomy      UTI:    LVQ and F/U cultures.     CAD:   no chest pain at this time  Not in failure  Continue the aspirin and plavix  . Hypothyroidism:   will c/w L-thyroxine     H/O Seizures   c/w Phenytoin  No hx of recent seizures  . Code Status: Full Code  Surrogate Decision Maker: wife [facility] [phone]  DVT Prophylaxis: Lovenox  GI Prophylaxis: Pepcid  Baseline: Fairly independent lives with wife in [facility]:     Chief Complaint / Reason for Physician Visit  . Discussed with RN events overnight. Still having abd pain with nausea    Review of Systems:  Symptom Y/N Comments  Symptom Y/N Comments   Fever/Chills n   Chest Pain     Poor Appetite    Edema n    Cough n   Abdominal Pain     Sputum    Joint Pain     SOB/PARSONS n   Pruritis/Rash     Nausea/vomit    Tolerating PT/OT     Diarrhea n   Tolerating Diet     Constipation    Other       Could NOT obtain due to:      Objective:     VITALS:   Last 24hrs VS reviewed since prior progress note.  Most recent are:  Patient Vitals for the past 24 hrs:   Temp Pulse Resp BP SpO2   17 1715 - 67 13 116/64 91 %   17 1707 - 73 20 126/66 90 %   17 1645 - 78 17 136/68 92 %   17 1640 - 77 21 135/70 95 %   08/17/17 1635 - 80 17 138/73 93 %   08/17/17 1632 98.4 °F (36.9 °C) 77 16 141/61 93 %   08/17/17 1631 - 84 21 141/61 93 %   08/17/17 1416 97.4 °F (36.3 °C) 97 18 127/70 95 %   08/17/17 1307 - - - - 93 %   08/17/17 1137 97.8 °F (36.6 °C) 87 18 144/73 92 %   08/17/17 0752 97.8 °F (36.6 °C) 85 20 100/70 94 %   08/17/17 0748 - - - - 94 %   08/17/17 0220 98 °F (36.7 °C) 90 18 94/65 90 %   08/17/17 0146 - - - - 95 %   08/16/17 2316 97.9 °F (36.6 °C) 89 18 130/73 91 %   08/16/17 2057 - - - - 92 %   08/16/17 1947 97.8 °F (36.6 °C) 80 18 150/75 (!) 88 %       Intake/Output Summary (Last 24 hours) at 08/17/17 1718  Last data filed at 08/17/17 1618   Gross per 24 hour   Intake             2650 ml   Output               25 ml   Net             2625 ml        PHYSICAL EXAM:  General: WD, WN. Alert, cooperative, no acute distress    EENT:  EOMI. Anicteric sclerae. MMM  Resp:  CTA bilaterally, no wheezing or rales. No accessory muscle use  CV:  Regular  rhythm,  No edema  GI:  Soft, Non distended, tender upper abd .  +Bowel sounds  Neurologic:  Alert and oriented X 3, normal speech,   Psych:   Good insight. Not anxious nor agitated  Skin:  No rashes. No jaundice    Reviewed most current lab test results and cultures  YES  Reviewed most current radiology test results   YES  Review and summation of old records today    NO  Reviewed patient's current orders and MAR    YES  PMH/SH reviewed - no change compared to H&P  ________________________________________________________________________  Care Plan discussed with:    Comments   Patient y    Family      RN y    Care Manager     Consultant                        Multidiciplinary team rounds were held today with , nursing, pharmacist and clinical coordinator. Patient's plan of care was discussed; medications were reviewed and discharge planning was addressed.      ________________________________________________________________________  Total NON critical care TIME:  20   Minutes    Total CRITICAL CARE TIME Spent:   Minutes non procedure based      Comments   >50% of visit spent in counseling and coordination of care     ________________________________________________________________________  Nicola Grullon MD     Procedures: see electronic medical records for all procedures/Xrays and details which were not copied into this note but were reviewed prior to creation of Plan. LABS:  I reviewed today's most current labs and imaging studies.   Pertinent labs include:  Recent Labs      08/16/17   0001  08/15/17   1301   WBC  9.8  10.7   HGB  12.0*  13.6   HCT  34.1*  38.9   PLT  130*  150     Recent Labs      08/16/17   0001  08/15/17   1301   NA  136  137   K  3.8  3.6   CL  108  102   CO2  22  26   GLU  145*  197*   BUN  17  24*   CREA  0.81  1.11   CA  7.6*  8.8   MG  2.1   --    ALB  2.9*  3.6   TBILI  0.4  0.5   SGOT  42*  57*   ALT  56  67

## 2017-08-17 NOTE — ANESTHESIA PREPROCEDURE EVALUATION
Anesthetic History   No history of anesthetic complications            Review of Systems / Medical History  Patient summary reviewed, nursing notes reviewed and pertinent labs reviewed    Pulmonary    COPD               Neuro/Psych   Within defined limits           Cardiovascular              CAD    Exercise tolerance: >4 METS  Comments: Aortic aneurysm, s/p open repair   GI/Hepatic/Renal  Within defined limits              Endo/Other      Hypothyroidism  Cancer     Other Findings   Comments: Cholecystitis            Physical Exam    Airway  Mallampati: II  TM Distance: 4 - 6 cm  Neck ROM: normal range of motion   Mouth opening: Normal     Cardiovascular  Regular rate and rhythm,  S1 and S2 normal,  no murmur, click, rub, or gallop             Dental    Dentition: Full lower dentures and Full upper dentures     Pulmonary  Breath sounds clear to auscultation               Abdominal  GI exam deferred       Other Findings            Anesthetic Plan    ASA: 3  Anesthesia type: general    Monitoring Plan: BIS      Induction: Intravenous  Anesthetic plan and risks discussed with: Patient

## 2017-08-17 NOTE — CONSULTS
Surgery Consult    Subjective:      Yaniv Herndon is a 74 y.o. male who presents for evaluation of abdominal pain. He was admitted with pancreatitis which has now improved. His work up demonstrated biliary sludge and pancreas divisum. HIDA demonstrated chronic cholecystitis. He denies alcohol use. The pain is described as improved and is 1/10 in intensity. Onset was 4 days ago. Symptoms have been completely resolved since. Aggravating factors: eating. Alleviating factors: none. Associated symptoms: none. The patient denies jaundice.     Past Medical History:   Diagnosis Date    CAD (coronary artery disease)     Other ill-defined conditions     marce kc, MI (1995), aortic aneurysm     Past Surgical History:   Procedure Laterality Date    ABDOMEN SURGERY PROC UNLISTED      hernia     CARDIAC SURG PROCEDURE UNLIST      stents placed    HX HEENT      cateracts removed, lens implants    HX OTHER SURGICAL      abdominal anuersym      Family History   Problem Relation Age of Onset    Elevated Lipids Mother     Heart Disease Mother     Asthma Brother     Cancer Brother     Diabetes Brother     Heart Disease Father      Social History     Social History    Marital status:      Spouse name: N/A    Number of children: N/A    Years of education: N/A     Social History Main Topics    Smoking status: Former Smoker     Packs/day: 0.50     Quit date: 8/15/2015    Smokeless tobacco: Never Used      Comment: e-cigarettes    Alcohol use No    Drug use: No    Sexual activity: Not Asked     Other Topics Concern    None     Social History Narrative      Current Facility-Administered Medications   Medication Dose Route Frequency Provider Last Rate Last Dose    levoFLOXacin (LEVAQUIN) 750 mg in D5W IVPB  750 mg IntraVENous Q24H Abraham Liz  mL/hr at 08/17/17 0144 750 mg at 08/17/17 0144    phenytoin ER (DILANTIN ER) ER capsule 100 mg  100 mg Oral DAILY Abraham Liz MD        phenytoin ER (DILANTIN ER) ER capsule 200 mg  200 mg Oral QPM Lata Beauchamp MD        sodium chloride (NS) flush 5-10 mL  5-10 mL IntraVENous Q8H [place]   10 mL at 08/17/17 0604    sodium chloride (NS) flush 5-10 mL  5-10 mL IntraVENous PRN [place], [place]   10 mL at 08/15/17 1456    aspirin chewable tablet 81 mg  81 mg Oral DAILY Gissel Lares MD   81 mg at 08/16/17 0827    clopidogrel (PLAVIX) tablet 75 mg  75 mg Oral DAILY Gissel Lares MD   75 mg at 08/16/17 0827    fluticasone-vilanterol (BREO ELLIPTA) 100mcg-25mcg/puff  1 Puff Inhalation DAILY Gissel Lares MD   1 Puff at 08/16/17 8055    levothyroxine (SYNTHROID) tablet 75 mcg  75 mcg Oral ACB Gissel Lares MD   75 mcg at 08/16/17 0826    tamsulosin (FLOMAX) capsule 0.4 mg  0.4 mg Oral DAILY Gissel Lares MD        umeclidinium (INCRUSE ELLIPTA) 62.5 mcg/actuation  1 Puff Inhalation DAILY Gissel Lares MD   1 Puff at 08/16/17 1508    0.9% sodium chloride infusion  75 mL/hr IntraVENous CONTINUOUS Gissel Lares MD 75 mL/hr at 08/16/17 1831 75 mL/hr at 08/16/17 1831    famotidine (PF) (PEPCID) 20 mg in sodium chloride 0.9 % 10 mL injection  20 mg IntraVENous Q12H Gissel Lares MD   20 mg at 08/16/17 2113    morphine injection 2 mg  2 mg IntraVENous Q4H PRN Gissel Lares MD        albuterol-ipratropium (DUO-NEB) 2.5 MG-0.5 MG/3 ML  3 mL Nebulization Q6H RT Gissel Lares MD   3 mL at 08/17/17 0748    methylPREDNISolone (PF) (SOLU-MEDROL) injection 60 mg  60 mg IntraVENous Elijose rafael Gutierrez MD   60 mg at 08/17/17 0604    enoxaparin (LOVENOX) injection 40 mg  40 mg SubCUTAneous Q24H Gissel Lares MD   40 mg at 08/16/17 7062    acetaminophen (TYLENOL) tablet 650 mg  650 mg Oral Q4H PRN Gissel Lares MD        ondansetron [facility]) injection 4 mg  4 mg IntraVENous Q6H PRN Gissel Lares MD            No Known Allergies    Review of Systems:  A comprehensive review of systems was negative except for that written in the History of Present Illness. Objective:      Patient Vitals for the past 8 hrs:   BP Temp Pulse Resp SpO2   17 0752 100/70 97.8 °F (36.6 °C) 85 20 94 %   17 0748 - - - - 94 %   17 0220 94/65 98 °F (36.7 °C) 90 18 90 %   17 0146 - - - - 95 %       Temp (24hrs), Av.8 °F (36.6 °C), Min:97.4 °F (36.3 °C), Max:98 °F (36.7 °C)      Physical Exam:  General:  Alert, cooperative, no distress, appears stated age. Eyes:  Conjunctivae/corneas clear. anicteric           Mouth/Throat: Lips, mucosa, and tongue normal. Teeth and gums normal.   Neck: Supple, symmetrical, trachea midline, no adenopathy, thyroid: no enlargment/tenderness/nodules, no carotid bruit and no JVD. Back:   Symmetric, no curvature. ROM normal. No CVA tenderness. Lungs:   Clear to auscultation bilaterally. Heart:  Regular rate and rhythm, S1, S2 normal, no murmur, click, rub or gallop. Abdomen:   Soft, non-tender. Bowel sounds normal. No masses,  No organomegaly. Midline surgical scar   Extremities: Extremities normal, atraumatic, no cyanosis or edema. Skin: Skin color, texture, turgor normal. No rashes or lesions   Lymph nodes: Cervical, supraclavicular, and axillary nodes normal.   Neurologic: CNII-XII intact. Normal strength, sensation and reflexes throughout.          Assessment:   Hospital Problems  Date Reviewed: 8/15/2017          Codes Class Noted POA    Acute pancreatitis ICD-10-CM: K85.90  ICD-9-CM: 144.5  8/15/2017 Yes        COPD exacerbation ([facility] [address] ICD-10-CM: J44.1  ICD-9-CM: 491.21  8/15/2017 Yes        UTI (urinary tract infection) ICD-10-CM: N39.0  ICD-9-CM: 599.0  8/15/2017 Yes        CAD (coronary artery disease) ICD-10-CM: I25.10  ICD-9-CM: 414.00  8/15/2017 Yes        Acquired hypothyroidism ICD-10-CM: E03.9  ICD-9-CM: 244.9  8/15/2017 Yes        S/P AAA repair ICD-10-CM: P86.624, Z86.79  ICD-9-CM: V45.89  8/15/2017 Yes            Gallstone pancreatitis is the most likely etiology. Plan:     I explained the anatomy and pathophysiology of biliary tract disease and cholecystitis, pancreatitis, cholangitis, choledocholithiasis. I explained about laparoscopic possible open cholecystectomy with possible cholangiogram and the risks of surgery including but not limited to bleeding, infection, bile duct or bowel injury, hernia development, retained common duct stones requiring further therapy, non resolution of symptoms, post cholecystectomy diarrhea, DVT, and risks of general anesthesia.     The patient wishes to proceed with a laparoscopic possible open cholecystectomy with cholangiogram under general anesthesia as an outpatient    I discussed the case with Dr Khushbu Rojas this morning      Signed By: Josseline Guido MD     August 17, 2017

## 2017-08-17 NOTE — PROGRESS NOTES
Bedside shift change report given to Sanya Lora (oncoming nurse) by Dharmesh Vyas (offgoing nurse). Report included the following information SBAR, Kardex, Intake/Output, MAR, Recent Results and Cardiac Rhythm . Arpan Abraham Phone for oncoming shift:   [phone]    Shift Summary: Pt had surgery today. LDAs               Peripheral IV 08/17/17 Right Antecubital (Active)   Site Assessment Intact 8/17/2017  6:54 PM   Phlebitis Assessment 0 8/17/2017  6:54 PM   Infiltration Assessment 0 8/17/2017  6:54 PM   Dressing Status Wet; Intact 8/17/2017  6:54 PM   Dressing Type Tape;Transparent 8/17/2017  6:54 PM   Hub Color/Line Status Pink;Capped 8/17/2017  6:54 PM   Action Taken Blood drawn 8/17/2017  2:20 AM       Peripheral IV 08/17/17 Left Arm (Active)   Site Assessment Clean, dry, & intact 8/17/2017  6:54 PM   Phlebitis Assessment 0 8/17/2017  6:54 PM   Infiltration Assessment 0 8/17/2017  6:54 PM   Dressing Status Clean, dry, & intact 8/17/2017  6:54 PM   Dressing Type Tape;Transparent 8/17/2017  6:54 PM   Hub Color/Line Status Pink;Capped 8/17/2017  6:54 PM                        Intake & Output   Date 08/16/17 1900 - 08/17/17 0659 08/17/17 0700 - 08/18/17 0659   Shift 2339-9459 24 Hour Total 5094-3351 8930-7554 24 Hour Total   I  N  T  A  K  E   P. O.  0 0  0      P. O.  0 0  0    I.V.  (mL/kg/hr) 1950 2650 700  (1)  700      I.V. 1050 1750         Volume (0.9% sodium chloride infusion) 900 900         Volume (lactated Ringers infusion)   700  700    Shift Total  (mL/kg) 1950  (32.2) 2650  (43.7) 700  (11.6)  700  (11.6)   O  U  T  P  U  T   Urine  (mL/kg/hr)           Urine Occurrence(s) 4 x 4 x       Stool           Stool Occurrence(s) 1 x 2 x       Blood   25  25      Estimated Blood Loss   25  25    Shift Total  (mL/kg)   25  (0.4)  25  (0.4)   NET 1950 2650 675  675   Weight (kg) 60.6 60.6 60.6 60.6 60.6      Last Bowel Movement Last Bowel Movement Date: 08/17/17   Glucose Checks [] N/A  [] AC/HS  [] Q6  Concerns:   Nutrition Active Orders   Diet    DIET CLEAR LIQUID       Consults []PT  []OT  []Speech  []Case Management   Cardiac Monitoring []N/A []Yes Expires:

## 2017-08-17 NOTE — DISCHARGE INSTRUCTIONS
Discharge Instructions:  Laparoscopic Cholecystectomy (Gallbladder Removal)  Dr. Domitila Norris    Call for appointment for follow up in 3 weeks [phone]    Activity:    Walk regularly. No lifting more than 10 -15 pounds for 4 weeks. Light aerobic activity is okay when you feel up to it. You may resume driving in three days unless still requiring narcotics for pain. Work:    You may return to work in 1 or 2 weeks to light activity. No lifting more than 10 pounds for four weeks. Diet:    You may resume normal diet after 24 hours. Fatty foods may still cause some stomach upset. Wound Care: You have a special dressing called Dermabond. It is okay to shower and let the water run over the incisions but do not scrub the area or soak in a tub. If you have a small amount of drainage you may place a dry bandage over the wound and change it daily. If you experience a lot of drainage, develop redness around the wound, or a fever over 101 F occurs please call the office. Medications:    Resume home medications as indicated on the Medical Reconciliation form. Aspirin and Coumadin can be restarted immediately if you were taking them preoperatively. If taking Plavix do not restart it until post operative day 2. Pain medications:  Non steroidal antiinflammatories seem to work best for post surgical pain. Try these first as prescribed. A narcotic prescription will also be given for breakthrough pain. Over the counter stool softeners and laxatives may be used if needed. Narcotics and anesthesia sometimes cause nausea and vomiting. If persistent please call the office. Do not hesitate to call with questions or concerns.

## 2017-08-17 NOTE — PROGRESS NOTES
TRANSFER - IN REPORT:    Verbal report received from [facility] (name) on Te Meehan  being received from PACU (unit) for routine progression of care      Report consisted of patients Situation, Background, Assessment and   Recommendations(SBAR). Information from the following report(s) SBAR, Kardex, Intake/Output, MAR, Recent Results and Cardiac Rhythm . was reviewed with the receiving nurse. Opportunity for questions and clarification was provided.         19:00- Pt arrived on unit

## 2017-08-17 NOTE — ANESTHESIA POSTPROCEDURE EVALUATION
Post-Anesthesia Evaluation and Assessment    Patient: Kalyan Rees MRN: [id number]  SSN: [id number]    YOB: 1945  Age: 74 y.o. Sex: male       Cardiovascular Function/Vital Signs  Visit Vitals    /75    Pulse 74    Temp 36.9 °C (98.4 °F)    Resp 17    Ht 5' 8\" (1.727 m)    Wt 60.6 kg (133 lb 9.6 oz)    SpO2 92%    BMI 20.31 kg/m2       Patient is status post general anesthesia for Procedure(s):  CHOLECYSTECTOMY LAPAROSCOPIC with Grams. Nausea/Vomiting: None    Postoperative hydration reviewed and adequate. Pain:  Pain Scale 1: FLACC (08/17/17 1802)  Pain Intensity 1: 0 (08/17/17 1416)   Managed    Neurological Status:   Neuro (WDL): Exceptions to WDL (08/17/17 1640)  Neuro  Neurologic State: Drowsy; Eyes open to stimulus (08/17/17 1640)  Orientation Level: Oriented to place (08/17/17 1640)  Cognition: Follows commands (08/17/17 1640)  Speech: Mouths words (08/17/17 1640)  LUE Motor Response: Purposeful (08/17/17 1640)  LLE Motor Response: Purposeful (08/17/17 1640)  RUE Motor Response: Purposeful (08/17/17 1640)  RLE Motor Response: Purposeful (08/17/17 1640)   At baseline    Mental Status and Level of Consciousness: Arousable    Pulmonary Status:   O2 Device: Blow by oxygen (08/17/17 1830)   Adequate oxygenation and airway patent    Complications related to anesthesia: None    Post-anesthesia assessment completed.  No concerns    Signed By: Sidney Ny MD     August 17, 2017

## 2017-08-17 NOTE — PROGRESS NOTES
F/U for gallstone pancreatitis     S: Mr. Zenia Mallory was seen by me today during rounds. At this time, he is resting +comfortably. There is no pain. The patient has no new complaints today. Please see admission consult for details of ROS; there are no other changes today. O: Blood pressure 94/65, pulse 90, temperature 98 °F (36.7 °C), resp. rate 18, height 5' 8\" (1.727 m), weight 60.6 kg (133 lb 9.6 oz), SpO2 90 %. Gen: Patient is in no acute distress. There is no jaundice. Lungs: Clear to auscultation bilaterally . Heart:+RRR. Abd: Soft, non tender, non-distended, bowel sounds present. Extremities: Warm. Lab Results   Component Value Date/Time    Lipase 243 08/17/2017 01:46 AM     Hida:  IMPRESSION  IMPRESSION: No evidence of acute cholecystitis or common bile duct obstruction. Impaired gallbladder contraction following CCK administration suggests chronic  cholecystitis/chronic cystic duct dysfunction.       A: Active Problems:    Acute pancreatitis (8/15/2017)      COPD exacerbation ([address] (8/15/2017)      UTI (urinary tract infection) (8/15/2017)      CAD (coronary artery disease) (8/15/2017)      Acquired hypothyroidism (8/15/2017)      S/P AAA repair (8/15/2017)        Comment:  He is doing well  P:  Surgery consult.   I spoke in person to Dr. Carrie Helms  eus and work up of pancreas duct abnormalities as outpatient  Diet per Dr. Manpreet Siddiqui MD  8:04 AM  8/17/2017

## 2017-08-17 NOTE — PERIOP NOTES
1700  Patient with increased agitation and confusion, attempting to climb out of bed, denies having to urinate, administered 25 micrograms IV fentanyl for pain, after patient repeatedly holding abdomen    1715  Patient more relaxed, but still slightly agitated and confused, administered 25 micrograms IV fentanyl for patient complaining of \"hurting\" and holding abdomen    1730  Patient with continued confusion, and removing nasal cannula, placed patient on blow by oxygen, spoke with patient's wife and she reports patient with increased agitation during this admission    1745  Patient attempting to climb out of bed, patient wife back to bedside, patient continues to be confused and attempting to get out of bed and pulling equipment off also called Dr. Stella Fontana to inform him of patient's status and no other orders recieved    1800  Patient back to bed and received order for sitter, patient's wife also still at bedside and patient less agitated, still confused    [phone] with Karyle Skiff, [address] [place] [address] and informed her of patient requiring sitter tonight  TRANSFER - OUT REPORT:    Verbal report given to Karyle Skiff, RN on [employer]  being transferred to [place] [phone] for routine post - op       Report consisted of patients Situation, Background, Assessment and   Recommendations(SBAR). Information from the following report(s) SBAR, Kardex, OR Summary, Procedure Summary and Intake/Output was reviewed with the receiving nurse. Lines:   Peripheral IV 08/17/17 Right Antecubital (Active)   Site Assessment Clean, dry, & intact 8/17/2017  4:40 PM   Phlebitis Assessment 0 8/17/2017  4:40 PM   Infiltration Assessment 0 8/17/2017  4:40 PM   Dressing Status Clean, dry, & intact 8/17/2017  4:40 PM   Dressing Type Tape;Transparent 8/17/2017  4:40 PM   Hub Color/Line Status Pink; Infusing 8/17/2017  4:40 PM   Action Taken Blood drawn 8/17/2017  2:20 AM       Peripheral IV 08/17/17 Left Arm (Active)   Site Assessment Clean, dry, & intact 8/17/2017  6:00 PM   Phlebitis Assessment 0 8/17/2017  6:00 PM   Infiltration Assessment 0 8/17/2017  6:00 PM   Dressing Status Clean, dry, & intact 8/17/2017  6:00 PM   Dressing Type Tape;Transparent 8/17/2017  6:00 PM   Hub Color/Line Status Pink; Infusing 8/17/2017  6:00 PM        Opportunity for questions and clarification was provided.       Patient transported with:   Registered Nurse  Tech     Also informed [address] , RN that I will call with any updates to patient status prior to sending patient upstairs to floor    [address], RN that patient was on the way up to floor and patient currently oriented X4 and less agitated, patient wife still at bedside

## 2017-08-18 VITALS
WEIGHT: 133.6 LBS | DIASTOLIC BLOOD PRESSURE: 66 MMHG | TEMPERATURE: 97.4 F | OXYGEN SATURATION: 92 % | RESPIRATION RATE: 18 BRPM | HEIGHT: 68 IN | HEART RATE: 92 BPM | BODY MASS INDEX: 20.25 KG/M2 | SYSTOLIC BLOOD PRESSURE: 92 MMHG

## 2017-08-18 LAB — LIPASE SERPL-CCNC: 199 U/L (ref 73–393)

## 2017-08-18 PROCEDURE — 74011250636 HC RX REV CODE- 250/636: Performed by: INTERNAL MEDICINE

## 2017-08-18 PROCEDURE — 36415 COLL VENOUS BLD VENIPUNCTURE: CPT | Performed by: SURGERY

## 2017-08-18 PROCEDURE — 77010033678 HC OXYGEN DAILY

## 2017-08-18 PROCEDURE — 74011000250 HC RX REV CODE- 250: Performed by: INTERNAL MEDICINE

## 2017-08-18 PROCEDURE — 74011250637 HC RX REV CODE- 250/637: Performed by: INTERNAL MEDICINE

## 2017-08-18 PROCEDURE — 77030021668 HC NEB PREFIL KT VYRM -A

## 2017-08-18 PROCEDURE — 83690 ASSAY OF LIPASE: CPT | Performed by: SURGERY

## 2017-08-18 PROCEDURE — 94640 AIRWAY INHALATION TREATMENT: CPT

## 2017-08-18 PROCEDURE — 74011250637 HC RX REV CODE- 250/637: Performed by: SURGERY

## 2017-08-18 RX ORDER — PREDNISONE 20 MG/1
40 TABLET ORAL
Qty: 15 TAB | Refills: 0 | Status: SHIPPED | OUTPATIENT
Start: 2017-08-18 | End: 2017-08-23

## 2017-08-18 RX ORDER — LEVOFLOXACIN 750 MG/1
750 TABLET ORAL DAILY
Qty: 7 TAB | Refills: 0 | Status: ON HOLD | OUTPATIENT
Start: 2017-08-18 | End: 2017-10-05

## 2017-08-18 RX ORDER — ONDANSETRON 4 MG/1
4 TABLET, FILM COATED ORAL
Qty: 10 TAB | Refills: 0 | Status: ON HOLD | OUTPATIENT
Start: 2017-08-18 | End: 2017-10-05

## 2017-08-18 RX ORDER — OXYCODONE AND ACETAMINOPHEN 5; 325 MG/1; MG/1
1 TABLET ORAL
Qty: 20 TAB | Refills: 0 | Status: ON HOLD | OUTPATIENT
Start: 2017-08-18 | End: 2017-10-05

## 2017-08-18 RX ADMIN — ENOXAPARIN SODIUM 40 MG: 30 INJECTION SUBCUTANEOUS at 08:32

## 2017-08-18 RX ADMIN — METHYLPREDNISOLONE SODIUM SUCCINATE 60 MG: 125 INJECTION, POWDER, FOR SOLUTION INTRAMUSCULAR; INTRAVENOUS at 06:00

## 2017-08-18 RX ADMIN — PHENYTOIN SODIUM 100 MG: 100 CAPSULE ORAL at 08:32

## 2017-08-18 RX ADMIN — LEVOTHYROXINE SODIUM 75 MCG: 75 TABLET ORAL at 08:32

## 2017-08-18 RX ADMIN — ONDANSETRON 4 MG: 2 INJECTION INTRAMUSCULAR; INTRAVENOUS at 08:56

## 2017-08-18 RX ADMIN — CLOPIDOGREL BISULFATE 75 MG: 75 TABLET ORAL at 08:32

## 2017-08-18 RX ADMIN — FLUTICASONE FUROATE AND VILANTEROL TRIFENATATE 1 PUFF: 100; 25 POWDER RESPIRATORY (INHALATION) at 08:42

## 2017-08-18 RX ADMIN — IPRATROPIUM BROMIDE AND ALBUTEROL SULFATE 3 ML: .5; 3 SOLUTION RESPIRATORY (INHALATION) at 02:03

## 2017-08-18 RX ADMIN — Medication 10 ML: at 05:56

## 2017-08-18 RX ADMIN — UMECLIDINIUM 1 PUFF: 62.5 AEROSOL, POWDER ORAL at 08:42

## 2017-08-18 RX ADMIN — OXYCODONE AND ACETAMINOPHEN 1 TABLET: 5; 325 TABLET ORAL at 08:32

## 2017-08-18 RX ADMIN — ASPIRIN 81 MG 81 MG: 81 TABLET ORAL at 08:32

## 2017-08-18 RX ADMIN — IPRATROPIUM BROMIDE AND ALBUTEROL SULFATE 3 ML: .5; 3 SOLUTION RESPIRATORY (INHALATION) at 08:12

## 2017-08-18 RX ADMIN — SODIUM CHLORIDE 75 ML/HR: 900 INJECTION, SOLUTION INTRAVENOUS at 00:20

## 2017-08-18 RX ADMIN — FAMOTIDINE 20 MG: 10 INJECTION, SOLUTION INTRAVENOUS at 08:32

## 2017-08-18 NOTE — PROGRESS NOTES
Bedside shift change report given to Jamison Hart (oncoming nurse) by Lenis Homans (offgoing nurse). Report included the following information SBAR, Kardex, Intake/Output, MAR and Recent Results. Zone Phone for oncoming shift:   [phone]    Shift Summary: Pt rested quietly. Some issues with pain following surgery, relieved by medication. LDAs               Peripheral IV 08/17/17 Left Arm (Active)   Site Assessment Clean, dry, & intact 8/18/2017  3:15 AM   Phlebitis Assessment 0 8/18/2017  3:15 AM   Infiltration Assessment 0 8/18/2017  3:15 AM   Dressing Status Clean, dry, & intact 8/18/2017  3:15 AM   Dressing Type Transparent;Tape 8/18/2017  3:15 AM   Hub Color/Line Status Pink; Infusing 8/18/2017  3:15 AM                        Intake & Output   Date 08/17/17 0700 - 08/18/17 0659 08/18/17 0700 - 08/19/17 0659   Shift 1543-8015 3759-5826 24 Hour Total 4441-2379 5396-0986 24 Hour Total   I  N  T  A  K  E   P. O. 0 240 240         P. O. 0 240 240       I.V.  (mL/kg/hr) 700  (1) 900  (1.2) 1600  (1.1)         I.V.  900 900         Volume (lactated Ringers infusion) 700  700       Shift Total  (mL/kg) 700  (11.6) 1140  (18.8) 1840  (30.4)      O  U  T  P  U  T   Urine  (mL/kg/hr)  550  (0.8) 550  (0.4)         Urine Voided  550 550       Blood 25  25         Estimated Blood Loss 25  25       Shift Total  (mL/kg) 25  (0.4) 550  (9.1) 575  (9.5)       327 2642      Weight (kg) 60.6 60.6 60.6 60.6 60.6 60.6      Last Bowel Movement Last Bowel Movement Date: 08/17/17   Glucose Checks [x] N/A  [] AC/HS  [] Q6  Concerns:   Nutrition Active Orders   Diet    DIET GI LITE (POST SURGICAL)       Consults []PT  []OT  []Speech  [x]Case Management   Cardiac Monitoring []N/A [x]Yes Expires:48 hrs

## 2017-08-18 NOTE — DISCHARGE SUMMARY
Hospitalist Discharge Summary     Patient ID:  Shara Self  [id number]  89 y.o.  1945    PCP on record: Meghana Tucker MD    Admit date: 8/15/2017  Discharge date and time: 8/18/2017      DISCHARGE DIAGNOSIS:  Acute Hypoxic Respiratory Failure POA:     Acute COPD Exacerbation:   Acute Pancreatitis  UTI    CAD:   Hypothyroidism:   H/O Seizures    ACTIVITY   As tolerated    DIET   Heart healthy    FOLLOW UP APPT  One week with PCP    . CONSULTATIONS:  IP CONSULT TO GASTROENTEROLOGY  IP CONSULT TO GENERAL SURGERY    Excerpted HPI from H&P of Rolanda Patterson MD:  Acute Hypoxic Respiratory Failure POA: O2 sat 86% at room air, will start supplemental O2 and wean down as tolerated. Acute COPD Exacerbation: will start LVQ, bronchodilators, steroids, check sputum. Acute Pancreatitis: with possibly  pancreas divisum, CBD seems not to be dilated and he is not a drinker, will keep NPO, monitor lipase and get GI evaluation. May need MRCP  UTI: start LVQ and F/U cultures. CAD: no chest pain at this time, monitor. Hypothyroidism: will c/w L-thyroxine  H/O Seizures: c/w Phenytoin. Code Status: Full Code  Surrogate Decision Maker: wife Roxana Hernandez [phone]  DVT Prophylaxis: Lovenox  GI Prophylaxis: Pepcid  Baseline: Fairly independent lives with wife in [facility]    ______________________________________________________________________  DISCHARGE SUMMARY/HOSPITAL COURSE:  for full details see H&P, daily progress notes, labs, consult notes.      74 MALE with hx of copd, seizures, cad was admitted with upper abdominal pain and acute hypoxic resp failure secondary secondary to exacerbation of copd and acute pancreatitis with a lipase of 3000, as well as a UTI    COPD and uti treated with iv levaquin , NEBS, oxygen iv steroids,   CT abdomen - showed enlarged cbd, enlarged gall bladder  Ultrasound of GB and liver SHOWED thickened GB wall and some sludge  HIDA scan showed diseased gall bladder, no stones, some sludge    He had a lap cholecystectomy on 8/17 and he has recovered since then  He feels much better today , not wheezing, abd pain is minimal , lipase has dropped down to 200s,   Not wheezing now,   Afebrile   Shall discahrge him home on tapering steroids, levaquin for 7 days  Follow up with pcp in one week            _______________________________________________________________________  Patient seen and examined by me on discharge day. Pertinent Findings:  Gen:    Not in distress  Chest: Clear lungs  CVS:   Regular rhythm. No edema  Abd:  Soft, not distended, not tender  Neuro:  Alert, oriented , walking ,   _______________________________________________________________________  DISCHARGE MEDICATIONS:   Current Discharge Medication List      START taking these medications    Details   oxyCODONE-acetaminophen (PERCOCET) 5-325 mg per tablet Take 1 Tab by mouth every six (6) hours as needed. Max Daily Amount: 4 Tabs. Qty: 20 Tab, Refills: 0      ondansetron hcl (ZOFRAN, AS HYDROCHLORIDE,) 4 mg tablet Take 1 Tab by mouth every eight (8) hours as needed for Nausea. Qty: 10 Tab, Refills: 0         CONTINUE these medications which have NOT CHANGED    Details   tamsulosin (FLOMAX) 0.4 mg capsule Take 0.4 mg by mouth daily. tiotropium (SPIRIVA WITH HANDIHALER) 18 mcg inhalation capsule Take 1 Cap by inhalation daily. fluticasone-salmeterol (ADVAIR DISKUS) 250-50 mcg/dose diskus inhaler Take 1 Puff by inhalation every twelve (12) hours. levothyroxine (SYNTHROID) 75 mcg tablet Take 75 mcg by mouth Daily (before breakfast). clopidogrel (PLAVIX) 75 mg tab Take 75 mg by mouth daily. aspirin 81 mg tablet Take 81 mg by mouth daily. phenytoin ER (DILANTIN ER) 100 mg ER capsule Take 100 mg by mouth two (2) times a day. 1 in morning, 2 at bedtime  Indications: Takes 100 mg in AM, and 200 mg in PM      multivitamins-minerals-lutein (CENTRUM SILVER) Tab Take 1 Tab by mouth daily. FISH OIL/BORAGE/FLAX/OM3,6,9#1 (TRIPLE OMEGA 3-6-9 PO) Take 1 Tab by mouth daily. My Recommended Diet, Activity, Wound Care, and follow-up labs are listed in the patient's Discharge Insturctions which I have personally completed and reviewed.     _______________________________________________________________________  DISPOSITION:    Home with Family: y   Home with HH/PT/OT/RN:    SNF/LTC:    LARA:    OTHER:        Condition at Discharge:  Stable  _______________________________________________________________________  Follow up with:   PCP : Everett Barclay MD  Follow-up Information     Follow up With Details Comments Contact Info    Bayron Lima MD On 9/6/2017 4;40pm  Please arrive 20min. early [address]  [facility] [address]  [address]      Everett Barclay MD  Please call when you get home to schedule a hospital follow-up [address] [place] [address],[address]  [address]  [facility] [phone]                Total time in minutes spent coordinating this discharge (includes going over instructions, follow-up, prescriptions, and preparing report for sign off to her PCP) : 40minutes    Signed:  Lata Beauchamp MD

## 2017-08-18 NOTE — PROGRESS NOTES
Admit Date: 8/15/2017    POD 1 Day Post-Op    Procedure:  Procedure(s):  CHOLECYSTECTOMY LAPAROSCOPIC with Grams      Assessment:   Active Problems:    Acute pancreatitis (8/15/2017)      COPD exacerbation ([address] (8/15/2017)      UTI (urinary tract infection) (8/15/2017)      CAD (coronary artery disease) (8/15/2017)      Acquired hypothyroidism (8/15/2017)      S/P AAA repair (8/15/2017)      1. Feels well  2. Still requiring oxygen  3. Good pain control  4. Plan/Recommendations/Medical Decision Makin. Looks good surgically  2. Ok for discharge anytime from surgical standpoint    Subjective:     Patient is without complaints    Objective:     Blood pressure 136/81, pulse 80, temperature 98.1 °F (36.7 °C), resp. rate 18, height 5' 8\" (1.727 m), weight 60.6 kg (133 lb 9.6 oz), SpO2 94 %. Temp (24hrs), Av.8 °F (36.6 °C), Min:97.2 °F (36.2 °C), Max:98.4 °F (36.9 °C)      Physical Exam:  LUNG: clear to auscultation bilaterally, HEART: regular rate and rhythm, S1, S2 normal, no murmur, click, rub or gallop, ABDOMEN: soft, non-distended approp tender.  Bowel sounds normal. No masses,  no organomegaly, incisions CDI    Labs:   Recent Results (from the past 48 hour(s))   LIPASE    Collection Time: 17  1:46 AM   Result Value Ref Range    Lipase 243 73 - 393 U/L   EKG, 12 LEAD, INITIAL    Collection Time: 17 10:56 AM   Result Value Ref Range    Ventricular Rate 85 BPM    Atrial Rate 85 BPM    P-R Interval 150 ms    QRS Duration 88 ms    Q-T Interval 412 ms    QTC Calculation (Bezet) 490 ms    Calculated P Axis 76 degrees    Calculated R Axis 14 degrees    Calculated T Axis 42 degrees    Diagnosis       Poor data quality  Normal sinus rhythm  Prolonged QT  Low voltage in the standard leads  No previous ECGs available  Confirmed by Devyn Warren ([id number]) on 2017 8:21:45 PM     LIPASE    Collection Time: 17  2:10 AM   Result Value Ref Range    Lipase 199 73 - 393 U/L       Data Review images and reports reviewed

## 2017-08-18 NOTE — PROGRESS NOTES
ADULT PROTOCOL: JET AEROSOL  REASSESSMENT    Patient  Vitaly Hung     74 y.o.   male     8/18/2017  2:13 AM    Breath Sounds Pre Procedure: Right Breath Sounds: Diminished                               Left Breath Sounds: Diminished    Breath Sounds Post Procedure: Right Breath Sounds: Diminished                                 Left Breath Sounds: Diminished    Breathing pattern: Pre procedure Breathing Pattern: Regular          Post procedure Breathing Pattern: Regular    Heart Rate: Pre procedure Pulse: 80           Post procedure Pulse: 80    Resp Rate: Pre procedure Respirations: 18           Post procedure Respirations: 18    Peak Flow: Pre bronchodilator   n/a          Post bronchodilator   n/a    FVC/FEV1: n/a    Incentive Spirometry:   n/a          Cough: Pre procedure Cough: Non-productive               Post procedure Cough: Non-productive    Suctioned: NO    Sputum: Pre procedure  none                 Post procedure  none    Oxygen: O2 Device: Nasal cannula   Flow rate (L/min) 2     Changed: NO    SpO2: Pre procedure SpO2: 94 %   with oxygen              Post procedure SpO2: 94 %  with oxygen    Nebulizer Therapy: Current medications Aerosolized Medications: DuoNeb      Changed: NO    Smoking History: quit 10-12 yr ago  Problem List:   Patient Active Problem List   Diagnosis Code    Acute pancreatitis K85.90    COPD exacerbation (HCC) J44.1    UTI (urinary tract infection) N39.0    CAD (coronary artery disease) I25.10    Acquired hypothyroidism E03.9    S/P AAA repair [id number], Z86.79       Respiratory Therapist: Jamila Reno, RT

## 2017-08-18 NOTE — PROGRESS NOTES
Pt admitted with pancreatitis. Pt lives with wife in [place], [place], PCP Dr Marisa Mcdermott, independent with adls, no dme, Medicare, full code, and anticipates d/c to home today if stable without d/c needs. Care Management Interventions  PCP Verified by CM:  Yes  Mode of Transport at Discharge: Self  Transition of Care Consult (CM Consult): Discharge Planning  MyChart Signup: No  Discharge Durable Medical Equipment: No  Health Maintenance Reviewed: Yes  Physical Therapy Consult: Yes  Occupational Therapy Consult: Yes  Speech Therapy Consult: No  Current Support Network: Lives with Spouse  Confirm Follow Up Transport: Family  Plan discussed with Pt/Family/Caregiver: Yes  Freedom of Choice Offered: Yes  Discharge Location  Discharge Placement: Home

## 2017-08-18 NOTE — PROGRESS NOTES
A post-op has been scheduled with Dr. Kanika Hay for Sept 6 at 4:40pm    Dr. Dani Doss office said to have the patient call the office when he gets home to schedule a f/u

## 2017-08-18 NOTE — PROGRESS NOTES
F/U for acute pancreatitis due to gallstones    S: Mr. Greg Perez was seen by me today during rounds. At this time, he is resting + comfortably. Post op pain, but none of the pain for which he was admitted. The patient has no other new complaints today. Please see admission consult for details of ROS; there are no changes today. O: Blood pressure 136/81, pulse 80, temperature 98.1 °F (36.7 °C), resp. rate 18, height 5' 8\" (1.727 m), weight 60.6 kg (133 lb 9.6 oz), SpO2 94 %. Gen: Patient is in no acute distress. There is no jaundice. Lungs: Clear to auscultation bilaterally . Heart:+RRR. Abd: Soft, non tender, non-distended, bowel sounds present. Extremities: Warm. Lab Results   Component Value Date/Time    WBC 9.8 08/16/2017 12:01 AM    HGB 12.0 08/16/2017 12:01 AM    HCT 34.1 08/16/2017 12:01 AM    PLATELET 857 30/13/7698 12:01 AM    MCV 99.4 08/16/2017 12:01 AM     Lab Results   Component Value Date/Time    Sodium 136 08/16/2017 12:01 AM    Potassium 3.8 08/16/2017 12:01 AM    Chloride 108 08/16/2017 12:01 AM    CO2 22 08/16/2017 12:01 AM    Anion gap 6 08/16/2017 12:01 AM    Glucose 145 08/16/2017 12:01 AM    BUN 17 08/16/2017 12:01 AM    Creatinine 0.81 08/16/2017 12:01 AM    BUN/Creatinine ratio 21 08/16/2017 12:01 AM    GFR est AA >60 08/16/2017 12:01 AM    GFR est non-AA >60 08/16/2017 12:01 AM    Calcium 7.6 08/16/2017 12:01 AM    Bilirubin, total 0.4 08/16/2017 12:01 AM    AST (SGOT) 42 08/16/2017 12:01 AM    Alk.  phosphatase 112 08/16/2017 12:01 AM    Protein, total 7.0 08/16/2017 12:01 AM    Albumin 2.9 08/16/2017 12:01 AM    Globulin 4.1 08/16/2017 12:01 AM    A-G Ratio 0.7 08/16/2017 12:01 AM    ALT (SGPT) 56 08/16/2017 12:01 AM      A: Active Problems:    Acute pancreatitis (8/15/2017)      COPD exacerbation ([facility] (8/15/2017)      UTI (urinary tract infection) (8/15/2017)      CAD (coronary artery disease) (8/15/2017)      Acquired hypothyroidism (8/15/2017)      S/P AAA repair (8/15/2017)        Comment:  He is doing well  P:  Diet per surgeon  Dc plan per Dr Raj Jaimes  I recommend   colonosocopy--screen  mrcp  To look at pancreas ducts and ?divisium  Follow up in the office. --fall 2017    Rafael Anand MD  7:11 AM  8/18/2017

## 2017-09-06 ENCOUNTER — OFFICE VISIT (OUTPATIENT)
Dept: SURGERY | Age: 72
End: 2017-09-06

## 2017-09-06 VITALS
DIASTOLIC BLOOD PRESSURE: 70 MMHG | HEIGHT: 68 IN | SYSTOLIC BLOOD PRESSURE: 127 MMHG | OXYGEN SATURATION: 96 % | WEIGHT: 132.5 LBS | BODY MASS INDEX: 20.08 KG/M2 | HEART RATE: 68 BPM

## 2017-09-06 DIAGNOSIS — Z09 POSTOPERATIVE EXAMINATION: Primary | ICD-10-CM

## 2017-09-06 NOTE — PROGRESS NOTES
Surgery  Follow up  Procedure: lap joleen and IOC and liver bx  OR date:  8/17/2017  Path:       FINAL PATHOLOGIC DIAGNOSIS   1. Gallbladder, cholecystectomy:   Cholelithiasis   2. Liver, needle core biopsy:   Benign liver parenchyma with no significant histopathologic change   No fibrosis or iron deposition   Comment   Trichrome and reticulin stains performed on the liver needle core biopsy demonstrate normal architecture with no increased fibrosis and iron stain is negative for hemosiderin.      S I feel fine, no diarrhea    Visit Vitals    /70 (BP 1 Location: Right arm, BP Patient Position: Sitting)    Pulse 68    Ht 5' 8\" (1.727 m)    Wt 60.1 kg (132 lb 8 oz)    SpO2 96%    BMI 20.15 kg/m2       O Incisions healing well without infection   No signs of hernia    A/P Doing well      RTC prn    Niya Smith MD FACS

## 2017-09-06 NOTE — MR AVS SNAPSHOT
Visit Information Date & Time Provider Department Dept. Phone Encounter #  
 9/6/2017  4:40 PM Corbin Larios MD [facility] [id number] Upcoming Health Maintenance Date Due DTaP/Tdap/Td series (1 - Tdap) 2/7/1964 FOBT Q 1 YEAR AGE 50-75 2/7/1993 ZOSTER VACCINE AGE 60> 12/7/2002 GLAUCOMA SCREENING Q2Y 2/7/2008 Pneumococcal 65+ Low/Medium Risk (1 of 2 - PCV13) 2/7/2008 MEDICARE YEARLY EXAM 2/7/2008 INFLUENZA AGE 9 TO ADULT 8/1/2017 Allergies as of 9/6/2017  Review Complete On: 9/6/2017 By: Corbin Larios MD  
 No Known Allergies Current Immunizations  Never Reviewed No immunizations on file. Not reviewed this visit You Were Diagnosed With   
  
 Codes Comments Postoperative examination    -  Primary ICD-10-CM: N52 ICD-9-CM: V67.00 Vitals BP Pulse Height(growth percentile) Weight(growth percentile) SpO2 BMI  
 127/70 (BP 1 Location: Right arm, BP Patient Position: Sitting) 68 5' 8\" (1.727 m) 132 lb 8 oz (60.1 kg) 96% 20.15 kg/m2 Smoking Status Former Smoker Vitals History BMI and BSA Data Body Mass Index Body Surface Area  
 20.15 kg/m 2 1.7 m 2 Your Updated Medication List  
  
   
This list is accurate as of: 9/6/17 11:59 PM.  Always use your most recent med list.  
  
  
  
  
 Anguiano Parcel 250-50 mcg/dose diskus inhaler Generic drug:  fluticasone-salmeterol Take 1 Puff by inhalation every twelve (12) hours. aspirin 81 mg tablet Take 81 mg by mouth daily. CENTRUM SILVER Tab tablet Generic drug:  multivitamins-minerals-lutein Take 1 Tab by mouth daily. clopidogrel 75 mg Tab Commonly known as:  PLAVIX Take 75 mg by mouth daily. levoFLOXacin 750 mg tablet Commonly known as:  Shellia Havers Take 1 Tab by mouth daily. levothyroxine 75 mcg tablet Commonly known as:  SYNTHROID  
 Take 75 mcg by mouth Daily (before breakfast). ondansetron hcl 4 mg tablet Commonly known as:  ZOFRAN (AS HYDROCHLORIDE) Take 1 Tab by mouth every eight (8) hours as needed for Nausea. oxyCODONE-acetaminophen 5-325 mg per tablet Commonly known as:  PERCOCET Take 1 Tab by mouth every six (6) hours as needed. Max Daily Amount: 4 Tabs. phenytoin  mg ER capsule Commonly known as:  DILANTIN ER Take 100 mg by mouth two (2) times a day. 1 in morning, 2 at bedtime  Indications: Takes 100 mg in AM, and 200 mg in PM  
  
 SPIRIVA WITH HANDIHALER 18 mcg inhalation capsule Generic drug:  tiotropium Take 1 Cap by inhalation daily. tamsulosin 0.4 mg capsule Commonly known as:  FLOMAX Take 0.4 mg by mouth daily. TRIPLE OMEGA 3-6-9 PO Take 1 Tab by mouth daily. Introducing [facility] SERVICES! [employer] introduces [facility] patient portal. Now you can access parts of your medical record, email your doctor's office, and request medication refills online. 1. In your internet browser, go to [email or web address] 2. Click on the First Time User? Click Here link in the Sign In box. You will see the New Member Sign Up page. 3. Enter your [facility] Access Code exactly as it appears below. You will not need to use this code after youve completed the sign-up process. If you do not sign up before the expiration date, you must request a new code. · [facility] Access Code: [email or web address] Expires: 11/16/2017  9:00 AM 
 
4. Enter the last four digits of your Social Security Number (xxxx) and Date of Birth (mm/dd/yyyy) as indicated and click Submit. You will be taken to the next sign-up page. 5. Create a [employer] ID. This will be your [facility] login ID and cannot be changed, so think of one that is secure and easy to remember. 6. Create a [employer] password. You can change your password at any time. 7. Enter your Password Reset Question and Answer. This can be used at a later time if you forget your password. 8. Enter your e-mail address. You will receive e-mail notification when new information is available in [address] 9. Click Sign Up. You can now view and download portions of your medical record. 10. Click the Download Summary menu link to download a portable copy of your medical information. If you have questions, please visit the Frequently Asked Questions section of the [employer] website. Remember, [employer] is NOT to be used for urgent needs. For medical emergencies, dial 911. Now available from your iPhone and Android! Please provide this summary of care documentation to your next provider. Your primary care clinician is listed as Kate Méndez. If you have any questions after today's visit, please call [phone].

## 2017-10-05 ENCOUNTER — HOSPITAL ENCOUNTER (OUTPATIENT)
Age: 72
Setting detail: OUTPATIENT SURGERY
Discharge: HOME | End: 2017-10-05
Attending: SPECIALIST | Admitting: SPECIALIST
Payer: MEDICARE

## 2017-10-05 ENCOUNTER — ANESTHESIA EVENT (OUTPATIENT)
Dept: ENDOSCOPY | Age: 72
End: 2017-10-05
Payer: MEDICARE

## 2017-10-05 ENCOUNTER — ANESTHESIA (OUTPATIENT)
Dept: ENDOSCOPY | Age: 72
End: 2017-10-05
Payer: MEDICARE

## 2017-10-05 VITALS
WEIGHT: 133.44 LBS | HEIGHT: 68 IN | BODY MASS INDEX: 20.22 KG/M2 | RESPIRATION RATE: 14 BRPM | DIASTOLIC BLOOD PRESSURE: 65 MMHG | HEART RATE: 76 BPM | SYSTOLIC BLOOD PRESSURE: 109 MMHG | TEMPERATURE: 97.6 F | OXYGEN SATURATION: 94 %

## 2017-10-05 PROBLEM — Z12.11 SCREEN FOR COLON CANCER: Status: ACTIVE | Noted: 2017-10-05

## 2017-10-05 PROBLEM — Z80.0 FAMILY HISTORY OF COLON CANCER: Status: ACTIVE | Noted: 2017-10-05

## 2017-10-05 PROCEDURE — 76040000007: Performed by: SPECIALIST

## 2017-10-05 PROCEDURE — 77030013992 HC SNR POLYP ENDOSC BSC -B: Performed by: SPECIALIST

## 2017-10-05 PROCEDURE — 74011250636 HC RX REV CODE- 250/636: Performed by: SPECIALIST

## 2017-10-05 PROCEDURE — 74011250636 HC RX REV CODE- 250/636

## 2017-10-05 PROCEDURE — 74011000250 HC RX REV CODE- 250

## 2017-10-05 PROCEDURE — 88305 TISSUE EXAM BY PATHOLOGIST: CPT | Performed by: SPECIALIST

## 2017-10-05 PROCEDURE — 76060000032 HC ANESTHESIA 0.5 TO 1 HR: Performed by: SPECIALIST

## 2017-10-05 RX ORDER — PROPOFOL 10 MG/ML
INJECTION, EMULSION INTRAVENOUS AS NEEDED
Status: DISCONTINUED | OUTPATIENT
Start: 2017-10-05 | End: 2017-10-05 | Stop reason: HOSPADM

## 2017-10-05 RX ORDER — FLUMAZENIL 0.1 MG/ML
0.2 INJECTION INTRAVENOUS
Status: CANCELLED | OUTPATIENT
Start: 2017-10-05 | End: 2017-10-05

## 2017-10-05 RX ORDER — ATROPINE SULFATE 0.1 MG/ML
0.5 INJECTION INTRAVENOUS
Status: DISCONTINUED | OUTPATIENT
Start: 2017-10-05 | End: 2017-10-05 | Stop reason: HOSPADM

## 2017-10-05 RX ORDER — NALOXONE HYDROCHLORIDE 0.4 MG/ML
0.4 INJECTION, SOLUTION INTRAMUSCULAR; INTRAVENOUS; SUBCUTANEOUS
Status: CANCELLED | OUTPATIENT
Start: 2017-10-05 | End: 2017-10-05

## 2017-10-05 RX ORDER — SODIUM CHLORIDE 0.9 % (FLUSH) 0.9 %
5-10 SYRINGE (ML) INJECTION EVERY 8 HOURS
Status: DISCONTINUED | OUTPATIENT
Start: 2017-10-05 | End: 2017-10-05 | Stop reason: HOSPADM

## 2017-10-05 RX ORDER — FLUMAZENIL 0.1 MG/ML
0.2 INJECTION INTRAVENOUS
Status: DISCONTINUED | OUTPATIENT
Start: 2017-10-05 | End: 2017-10-05 | Stop reason: HOSPADM

## 2017-10-05 RX ORDER — FENTANYL CITRATE 50 UG/ML
50 INJECTION, SOLUTION INTRAMUSCULAR; INTRAVENOUS
Status: CANCELLED | OUTPATIENT
Start: 2017-10-05 | End: 2017-10-05

## 2017-10-05 RX ORDER — CLOPIDOGREL BISULFATE 75 MG/1
75 TABLET ORAL DAILY
Qty: 1 TAB | Refills: 0 | Status: SHIPPED | OUTPATIENT
Start: 2017-10-06

## 2017-10-05 RX ORDER — PHENYLEPHRINE HCL IN 0.9% NACL 0.4MG/10ML
SYRINGE (ML) INTRAVENOUS AS NEEDED
Status: DISCONTINUED | OUTPATIENT
Start: 2017-10-05 | End: 2017-10-05 | Stop reason: HOSPADM

## 2017-10-05 RX ORDER — DEXTROMETHORPHAN/PSEUDOEPHED 2.5-7.5/.8
1.2 DROPS ORAL
Status: DISCONTINUED | OUTPATIENT
Start: 2017-10-05 | End: 2017-10-05 | Stop reason: HOSPADM

## 2017-10-05 RX ORDER — EPINEPHRINE 0.1 MG/ML
1 INJECTION INTRACARDIAC; INTRAVENOUS
Status: DISCONTINUED | OUTPATIENT
Start: 2017-10-05 | End: 2017-10-05 | Stop reason: HOSPADM

## 2017-10-05 RX ORDER — SODIUM CHLORIDE 0.9 % (FLUSH) 0.9 %
5-10 SYRINGE (ML) INJECTION AS NEEDED
Status: DISCONTINUED | OUTPATIENT
Start: 2017-10-05 | End: 2017-10-05 | Stop reason: HOSPADM

## 2017-10-05 RX ORDER — LIDOCAINE HYDROCHLORIDE 20 MG/ML
INJECTION, SOLUTION EPIDURAL; INFILTRATION; INTRACAUDAL; PERINEURAL AS NEEDED
Status: DISCONTINUED | OUTPATIENT
Start: 2017-10-05 | End: 2017-10-05 | Stop reason: HOSPADM

## 2017-10-05 RX ORDER — GLYCOPYRROLATE 0.2 MG/ML
INJECTION INTRAMUSCULAR; INTRAVENOUS AS NEEDED
Status: DISCONTINUED | OUTPATIENT
Start: 2017-10-05 | End: 2017-10-05 | Stop reason: HOSPADM

## 2017-10-05 RX ORDER — MIDAZOLAM HYDROCHLORIDE 1 MG/ML
.25-5 INJECTION, SOLUTION INTRAMUSCULAR; INTRAVENOUS
Status: CANCELLED | OUTPATIENT
Start: 2017-10-05 | End: 2017-10-05

## 2017-10-05 RX ORDER — SODIUM CHLORIDE 9 MG/ML
75 INJECTION, SOLUTION INTRAVENOUS CONTINUOUS
Status: DISCONTINUED | OUTPATIENT
Start: 2017-10-05 | End: 2017-10-05 | Stop reason: HOSPADM

## 2017-10-05 RX ADMIN — SODIUM CHLORIDE 75 ML/HR: 900 INJECTION, SOLUTION INTRAVENOUS at 11:02

## 2017-10-05 RX ADMIN — Medication 80 MCG: at 12:24

## 2017-10-05 RX ADMIN — LIDOCAINE HYDROCHLORIDE 40 MG: 20 INJECTION, SOLUTION EPIDURAL; INFILTRATION; INTRACAUDAL; PERINEURAL at 11:55

## 2017-10-05 RX ADMIN — GLYCOPYRROLATE 0.1 MG: 0.2 INJECTION INTRAMUSCULAR; INTRAVENOUS at 11:55

## 2017-10-05 RX ADMIN — Medication 80 MCG: at 12:11

## 2017-10-05 RX ADMIN — Medication 80 MCG: at 12:21

## 2017-10-05 RX ADMIN — Medication 80 MCG: at 12:01

## 2017-10-05 RX ADMIN — Medication 40 MCG: at 12:03

## 2017-10-05 RX ADMIN — Medication 40 MCG: at 11:58

## 2017-10-05 RX ADMIN — PROPOFOL 150 MG: 10 INJECTION, EMULSION INTRAVENOUS at 12:09

## 2017-10-05 NOTE — ANESTHESIA POSTPROCEDURE EVALUATION
Post-Anesthesia Evaluation and Assessment    Patient: Severo Ulloa MRN: [id number]  SSN: [id number]    YOB: 1945  Age: 74 y.o. Sex: male       Cardiovascular Function/Vital Signs  Visit Vitals    /58    Pulse 77    Temp 36.4 °C (97.6 °F)    Resp 14    Ht 5' 8\" (1.727 m)    Wt 60.5 kg (133 lb 7 oz)    SpO2 97%    BMI 20.29 kg/m2       Patient is status post general, total IV anesthesia anesthesia for Procedure(s):  COLONOSCOPY  ENDOSCOPIC POLYPECTOMY. Nausea/Vomiting: None    Postoperative hydration reviewed and adequate. Pain:  Pain Scale 1: Visual (10/05/17 1242)  Pain Intensity 1: 0 (10/05/17 1242)   Managed    Neurological Status: At baseline    Mental Status and Level of Consciousness: Arousable    Pulmonary Status:   O2 Device: Room air (10/05/17 1242)   Adequate oxygenation and airway patent    Complications related to anesthesia: None    Post-anesthesia assessment completed.  No concerns    Signed By: Huong Fowler MD     October 5, 2017

## 2017-10-05 NOTE — H&P
Pre-endoscopy H and P    The patient was seen and examined in the endoscopy suite. The airway was assessed and docuemented. The problem list, past medical history, and medications were reviewed. Patient Active Problem List   Diagnosis Code    Acute pancreatitis K85.90    COPD exacerbation (HCC) J44.1    UTI (urinary tract infection) N39.0    CAD (coronary artery disease) I25.10    Acquired hypothyroidism E03.9    S/P AAA repair J56.351, Z86.79    Screen for colon cancer Z12.11    Family history of colon cancer Z80.0     Social History     Social History    Marital status:      Spouse name: N/A    Number of children: N/A    Years of education: N/A     Occupational History    Not on file. Social History Main Topics    Smoking status: Former Smoker     Packs/day: 0.50     Quit date: 8/15/2015    Smokeless tobacco: Never Used      Comment: e-cigarettes    Alcohol use No    Drug use: No    Sexual activity: Not on file     Other Topics Concern    Not on file     Social History Narrative     Past Medical History:   Diagnosis Date    A[facility]m ([facility]     CAD (coronary artery disease)     Cancer ([facility]     bladder    Chronic obstructive pulmonary disease (HCC)     Heart failure ([facility]     Other ill-defined conditions(799.89)     [facility], [place] (1995), aortic aneurysm    Screen for colon cancer 10/5/2017    Seizures ([facility]     last 10 years ago     The patient has a family history of (see above)    Prior to Admission Medications   Prescriptions Last Dose Informant Patient Reported? Taking? FISH OIL/BORAGE/FLAX/OM3,6,9#1 (TRIPLE OMEGA 3-6-9 PO) 9/30/2017  Yes No   Sig: Take 1 Tab by mouth daily. aspirin 81 mg tablet 9/30/2017  Yes No   Sig: Take 81 mg by mouth daily. clopidogrel (PLAVIX) 75 mg tab 9/30/2017  Yes No   Sig: Take 75 mg by mouth daily.    fluticasone-salmeterol (ADVAIR DISKUS) 250-50 mcg/dose diskus inhaler 10/5/2017 at Unknown time  Yes Yes   Sig: Take 1 Puff by inhalation every twelve (12) hours. levothyroxine (SYNTHROID) 75 mcg tablet 9/30/2017  Yes No   Sig: Take 75 mcg by mouth Daily (before breakfast). multivitamins-minerals-lutein (CENTRUM SILVER) Tab 9/30/2017  Yes No   Sig: Take 1 Tab by mouth daily. phenytoin ER (DILANTIN ER) 100 mg ER capsule 10/5/2017 at Unknown time  Yes Yes   Sig: Take 100 mg by mouth two (2) times a day. 1 in morning, 2 at bedtime  Indications: Takes 100 mg in AM, and 200 mg in PM   tamsulosin (FLOMAX) 0.4 mg capsule 10/4/2017 at Unknown time  Yes Yes   Sig: Take 0.4 mg by mouth daily. tiotropium (SPIRIVA WITH HANDIHALER) 18 mcg inhalation capsule 10/4/2017 at Unknown time  Yes Yes   Sig: Take 1 Cap by inhalation daily. Facility-Administered Medications: None           The review of systems is:  negative for shortness of breath or chest pain      The heart, lungs, and mental status were satisfactory for the administration of anesthesia sedation and for the procedure. I discussed with the patient the objectives, risks, consequences and alternatives to the procedure.       Shital Church MD  10/5/2017  11:47 AM

## 2017-10-05 NOTE — ANESTHESIA PREPROCEDURE EVALUATION
Anesthetic History   No history of anesthetic complications            Review of Systems / Medical History  Patient summary reviewed, nursing notes reviewed and pertinent labs reviewed    Pulmonary  Within defined limits  COPD               Neuro/Psych   Within defined limits  seizures         Cardiovascular  Within defined limits            CAD, PAD and cardiac stents    Exercise tolerance: >4 METS     GI/Hepatic/Renal  Within defined limits              Endo/Other  Within defined limits    Hypothyroidism       Other Findings              Physical Exam    Airway  Mallampati: II  TM Distance: 4 - 6 cm  Neck ROM: normal range of motion   Mouth opening: Normal     Cardiovascular  Regular rate and rhythm,  S1 and S2 normal,  no murmur, click, rub, or gallop             Dental    Dentition: Full upper dentures and Full lower dentures     Pulmonary  Breath sounds clear to auscultation               Abdominal  GI exam deferred       Other Findings            Anesthetic Plan    ASA: 3  Anesthesia type: general and total IV anesthesia          Induction: Intravenous  Anesthetic plan and risks discussed with: Patient

## 2017-10-05 NOTE — IP AVS SNAPSHOT
[address] [facility] 
[phone] Patient: Veronica Chaney MRN: [id number] BFY:[id number] You are allergic to the following No active allergies Recent Documentation Height Weight BMI Smoking Status 1.727 m 60.5 kg 20.29 kg/m2 Former Smoker Emergency Contacts Name Discharge Info Relation Home Work Mobile Alessia Espinosa DISCHARGE CAREGIVER [3] Spouse [3] [phone] About your hospitalization You were admitted on:  October 5, 2017 You last received care in the:  [facility] ENDOSCOPY You were discharged on:  October 5, 2017 Unit phone number:  [phone] Why you were hospitalized Your primary diagnosis was:  Not on File Your diagnoses also included:  Screen For Colon Cancer, Family History Of Colon Cancer Providers Seen During Your Hospitalizations Provider Role Specialty Primary office phone Gladys Pabon MD Attending Provider Gastroenterology [phone] Your Primary Care Physician (PCP) Primary Care Physician Office Phone Office Fax [facility] [phone] Follow-up Information Follow up With Details Comments Contact Info Kumar Moore MD   [address] [place] [address] [phone] Current Discharge Medication List  
  
CONTINUE these medications which have NOT CHANGED Dose & Instructions Dispensing Information Comments Morning Noon Evening Bedtime ADVAIR DISKUS 250-50 mcg/dose diskus inhaler Generic drug:  fluticasone-salmeterol Your last dose was: Your next dose is:    
   
   
 Dose:  1 Puff Take 1 Puff by inhalation every twelve (12) hours. Refills:  0  
     
   
   
   
  
 aspirin 81 mg tablet Your last dose was: Your next dose is:    
   
   
 Dose:  81 mg Take 81 mg by mouth daily. Refills:  0 CENTRUM SILVER Tab tablet Generic drug:  multivitamins-minerals-lutein Your last dose was: Your next dose is:    
   
   
 Dose:  1 Tab Take 1 Tab by mouth daily. Refills:  0  
     
   
   
   
  
 clopidogrel 75 mg Tab Commonly known as:  PLAVIX Start taking on:  10/6/2017 Your last dose was: Your next dose is:    
   
   
 Dose:  75 mg Take 1 Tab by mouth daily. Quantity:  1 Tab Refills:  0  
     
   
   
   
  
 levothyroxine 75 mcg tablet Commonly known as:  SYNTHROID Your last dose was: Your next dose is:    
   
   
 Dose:  75 mcg Take 75 mcg by mouth Daily (before breakfast). Refills:  0 phenytoin  mg ER capsule Commonly known as:  DILANTIN ER Your last dose was: Your next dose is:    
   
   
 Dose:  100 mg Take 100 mg by mouth two (2) times a day. 1 in morning, 2 at bedtime  Indications: Takes 100 mg in AM, and 200 mg in PM  
 Refills:  0 SPIRIVA WITH HANDIHALER 18 mcg inhalation capsule Generic drug:  tiotropium Your last dose was: Your next dose is:    
   
   
 Dose:  1 Cap Take 1 Cap by inhalation daily. Refills:  0  
     
   
   
   
  
 tamsulosin 0.4 mg capsule Commonly known as:  FLOMAX Your last dose was: Your next dose is:    
   
   
 Dose:  0.4 mg Take 0.4 mg by mouth daily. Refills:  0  
     
   
   
   
  
 TRIPLE OMEGA 3-6-9 PO Your last dose was: Your next dose is:    
   
   
 Dose:  1 Tab Take 1 Tab by mouth daily. Refills:  0 Where to Get Your Medications Information on where to get these meds will be given to you by the nurse or doctor. ! Ask your nurse or doctor about these medications  
  clopidogrel 75 mg Tab Discharge Instructions Jalen Andrade [id number] 
1945 Procedure  Discharge Instructions:   
 
Discomfort: 
Redness at IV site- apply warm compress to area; if redness or soreness persist- contact your physician There may be a slight amount of blood passed from the rectum Gaseous discomfort- walking, belching will help relieve any discomfort You may not operate a vehicle for 12 hours You may not engage in an occupation involving machinery or appliances for rest of today You may not drink alcoholic beverages for at least 12 hours Avoid making any critical decisions for at least 24 hour DIET: 
 You may resume your normal diet today. You should not overeat or \"feast\" today as your abdomen may become distended or uncomfortable. MEDICATIONS: 
 I reconciled this list from the list you gave us when you came today for the procedure. Please clarify with me, your primary care physician and the nurse who is discharging you if we have any discrepancies. Aspirin and or non-steroidal medication (Ibuprofen, Motrin, naproxen, etc.) is ok in limited quantities. ACTIVITY: 
You may resume your normal daily activities it is recommended that you spend the remainder of the day resting -  avoid any strenuous activity. CALL M.D. ANY SIGN OF: Increasing pain, nausea, vomiting Abdominal distension (swelling) New increased bleeding (oral or rectal) Fever (chills) Pain in chest area Bloody discharge from nose or mouth Shortness of breath Follow-up Instructions: 
 Call Dr. Mayra Dempsey for the results of  biopsy in approximately one week Telephone #  [phone] Follow up visit as needed; repeat 5 years Restart plavix tomorrow Praveen Ayala MD 
12:15 PM 
10/5/2017 Discharge Orders None Introducing [facility]! Marychuy Carrera introduces [employer] patient portal. Now you can access parts of your medical record, email your doctor's office, and request medication refills online.    
 
1. In your internet browser, go to [email or web address] 2. Click on the First Time User? Click Here link in the Sign In box. You will see the New Member Sign Up page. 3. Enter your SendTask Access Code exactly as it appears below. You will not need to use this code after youve completed the sign-up process. If you do not sign up before the expiration date, you must request a new code. · SendTask Access Code: [email or web address] Expires: 11/16/2017  9:00 AM 
 
4. Enter the last four digits of your Social Security Number (xxxx) and Date of Birth (mm/dd/yyyy) as indicated and click Submit. You will be taken to the next sign-up page. 5. Create a SendTask ID. This will be your SendTask login ID and cannot be changed, so think of one that is secure and easy to remember. 6. Create a SendTask password. You can change your password at any time. 7. Enter your Password Reset Question and Answer. This can be used at a later time if you forget your password. 8. Enter your e-mail address. You will receive e-mail notification when new information is available in [address] 9. Click Sign Up. You can now view and download portions of your medical record. 10. Click the Download Summary menu link to download a portable copy of your medical information. If you have questions, please visit the Frequently Asked Questions section of the SendTask website. Remember, SendTask is NOT to be used for urgent needs. For medical emergencies, dial 911. Now available from your iPhone and Android! General Information Please provide this summary of care documentation to your next provider. Patient Signature:  ____________________________________________________________ Date:  ____________________________________________________________  
  
Rosio Sleight Provider Signature:  ____________________________________________________________ Date:  ____________________________________________________________

## 2017-10-05 NOTE — PROCEDURES
Colonoscopy    Indications: screen  Above average risk due to brother with colon cancer    Pre-operative Diagnosis: see above    Medications:  See anesthesia form    Post-operative Diagnosis:  Sigmoid colon polyp      Procedure Details   Prior to the procedure its objectives, risks, consequences and alternatives were discussed with the patient who then elected to proceed. All questions were answered. Digital Rectal Exam:  was normal     The Olympus videocolonoscope was inserted in the rectum and advanced to the cecum. The cecum was identified by typical landmarks. The colonoscope was slowly and carefully withdrawn as the mucosa was inspected. At 25 cm in the sigmoid colon an 8mm sessile polyp was snared and recovered with good hemostasis. No other abnormalities were noted. Retroflexion in the rectum was remarkable for grade I internal hemorrhoids. Photos to document the ileocecal valve, appendiceal orifice and retroflexion exam were obtained. The preparation was adequate      Estimated Blood Loss:  none    Specimens:  Sigmoid polyp    Findings:  One polyp  Internal hemorrhoids    Complications:  none    Repeat colonoscopy is recommended in:  Five years.                Marivel Nicholas MD  12:12 PM  10/5/2017

## 2017-10-05 NOTE — DISCHARGE INSTRUCTIONS
Marlyn Harmon  [id number]  1945              Procedure  Discharge Instructions:      Discomfort:  Redness at IV site- apply warm compress to area; if redness or soreness persist- contact your physician  There may be a slight amount of blood passed from the rectum  Gaseous discomfort- walking, belching will help relieve any discomfort  You may not operate a vehicle for 12 hours  You may not engage in an occupation involving machinery or appliances for rest of today  You may not drink alcoholic beverages for at least 12 hours  Avoid making any critical decisions for at least 24 hour  DIET:   You may resume your normal diet today. You should not overeat or \"feast\" today as your abdomen may become distended or uncomfortable. MEDICATIONS:   I reconciled this list from the list you gave us when you came today for the procedure. Please clarify with me, your primary care physician and the nurse who is discharging you if we have any discrepancies. Aspirin and or non-steroidal medication (Ibuprofen, Motrin, naproxen, etc.) is ok in limited quantities. ACTIVITY:  You may resume your normal daily activities it is recommended that you spend the remainder of the day resting -  avoid any strenuous activity. CALL M.D.   ANY SIGN OF:  Increasing pain, nausea, vomiting  Abdominal distension (swelling)  New increased bleeding (oral or rectal)  Fever (chills)  Pain in chest area  Bloody discharge from nose or mouth  Shortness of breath          Follow-up Instructions:   Call Dr. Sherwin Calvert for the results of  biopsy in approximately one week  Telephone #  [phone]  Follow up visit as needed; repeat 5 years  Restart plavix tomorrow  Snow Castellon MD  12:15 PM  10/5/2017

## 2017-10-05 NOTE — ROUTINE PROCESS
[facility] PHYSICAL REHABILITATION  1945  [id number]    Situation:  Verbal report received from: Essence Tolentino RN  Procedure: Procedure(s) with comments:  COLONOSCOPY  ENDOSCOPIC POLYPECTOMY - polypectomy  -  cold snare    Background:    Preoperative diagnosis: ABN CT OF PANCREAS  Postoperative diagnosis: Sigmoid colon polyp    :  Dr. Bao Vela  Assistant(s): Endoscopy Technician-1: Silvia Membreno  Endoscopy RN-1: Jonathan Salas RN    Specimens:   ID Type Source Tests Collected by Time Destination   1 : Path - Sigmoid colon polyp Preservative Sigmoid  Richa Rojo MD 10/5/2017 1200 Pathology     H. Pylori  no    Assessment:  Intra-procedure medications       Anesthesia gave intra-procedure sedation and medications, see anesthesia flow sheet yes    Intravenous fluids: NS@ KVO     Vital signs stable       Abdominal assessment: round and soft       Recommendation:  Discharge patient per MD order  .     Family or Friend  Wife Emi Edmondson to share finding with family or friend yes

## 2017-11-07 ENCOUNTER — APPOINTMENT (OUTPATIENT)
Age: 72
Setting detail: DERMATOLOGY
End: 2017-11-09

## 2017-11-07 DIAGNOSIS — L82.1 OTHER SEBORRHEIC KERATOSIS: ICD-10-CM

## 2017-11-07 DIAGNOSIS — D22 MELANOCYTIC NEVI: ICD-10-CM

## 2017-11-07 DIAGNOSIS — L57.8 OTHER SKIN CHANGES DUE TO CHRONIC EXPOSURE TO NONIONIZING RADIATION: ICD-10-CM

## 2017-11-07 DIAGNOSIS — Z85.828 PERSONAL HISTORY OF OTHER MALIGNANT NEOPLASM OF SKIN: ICD-10-CM

## 2017-11-07 DIAGNOSIS — Z71.89 OTHER SPECIFIED COUNSELING: ICD-10-CM

## 2017-11-07 DIAGNOSIS — L57.0 ACTINIC KERATOSIS: ICD-10-CM

## 2017-11-07 PROBLEM — D22.5 MELANOCYTIC NEVI OF TRUNK: Status: ACTIVE | Noted: 2017-11-07

## 2017-11-07 PROBLEM — D22.4 MELANOCYTIC NEVI OF SCALP AND NECK: Status: ACTIVE | Noted: 2017-11-07

## 2017-11-07 PROCEDURE — OTHER OBSERVATION: OTHER

## 2017-11-07 PROCEDURE — OTHER LIQUID NITROGEN: OTHER

## 2017-11-07 PROCEDURE — OTHER MIPS QUALITY: OTHER

## 2017-11-07 PROCEDURE — 17000 DESTRUCT PREMALG LESION: CPT

## 2017-11-07 PROCEDURE — 99213 OFFICE O/P EST LOW 20 MIN: CPT | Mod: 25

## 2017-11-07 PROCEDURE — 17003 DESTRUCT PREMALG LES 2-14: CPT

## 2017-11-07 PROCEDURE — OTHER COUNSELING: OTHER

## 2017-11-07 PROCEDURE — OTHER REASSURANCE: OTHER

## 2017-11-07 ASSESSMENT — LOCATION DETAILED DESCRIPTION DERM
LOCATION DETAILED: LEFT INFERIOR LATERAL FOREHEAD
LOCATION DETAILED: LEFT INFERIOR VERMILION LIP
LOCATION DETAILED: RIGHT LATERAL FOREHEAD
LOCATION DETAILED: RIGHT INFERIOR VERMILION LIP
LOCATION DETAILED: LEFT SUPERIOR LATERAL MALAR CHEEK
LOCATION DETAILED: NASAL DORSUM
LOCATION DETAILED: LEFT DISTAL DORSAL FOREARM
LOCATION DETAILED: LEFT PROXIMAL DORSAL FOREARM
LOCATION DETAILED: RIGHT DISTAL DORSAL FOREARM
LOCATION DETAILED: EPIGASTRIC SKIN
LOCATION DETAILED: RIGHT LOWER CUTANEOUS LIP
LOCATION DETAILED: RIGHT CLAVICULAR NECK

## 2017-11-07 ASSESSMENT — LOCATION SIMPLE DESCRIPTION DERM
LOCATION SIMPLE: LEFT FOREARM
LOCATION SIMPLE: LEFT CHEEK
LOCATION SIMPLE: RIGHT FOREARM
LOCATION SIMPLE: RIGHT LIP
LOCATION SIMPLE: RIGHT ANTERIOR NECK
LOCATION SIMPLE: ABDOMEN
LOCATION SIMPLE: NOSE
LOCATION SIMPLE: LEFT LIP
LOCATION SIMPLE: LEFT FOREHEAD
LOCATION SIMPLE: RIGHT FOREHEAD

## 2017-11-07 ASSESSMENT — LOCATION ZONE DERM
LOCATION ZONE: NECK
LOCATION ZONE: FACE
LOCATION ZONE: NOSE
LOCATION ZONE: LIP
LOCATION ZONE: TRUNK
LOCATION ZONE: ARM

## 2017-11-07 NOTE — PROCEDURE: MIPS QUALITY
Quality 226: Preventive Care And Screening: Tobacco Use: Screening And Cessation Intervention: Patient screened for tobacco and is an ex-smoker
Quality 265: Biopsy Follow-Up: Biopsy results reviewed, communicated, tracked, and documented
Quality 154 Part B: Falls: Risk Screening (Should Be Reported With Measure 155.): Patient screened for future fall risk; documentation of no falls in the past year or only one fall without injury in the past year
Quality 47: Advance Care Plan: Advance Care Planning discussed and documented in the medical record; patient did not wish or was not able to name a surrogate decision maker or provide an advance care plan.
Quality 154 Part A: Falls: Risk Assessment (Should Be Reported With Measure 155.): Falls risk assessment completed and documented in the past 12 months.
Quality 110: Preventive Care And Screening: Influenza Immunization: Influenza Immunization Administered during Influenza season
Quality 431: Preventive Care And Screening: Unhealthy Alcohol Use - Screening: Patient screened for unhealthy alcohol use using a single question and scores less than 2 times per year
Quality 130: Documentation Of Current Medications In The Medical Record: Current Medications Documented
Quality 131: Pain Assessment And Follow-Up: Pain assessment using a standardized tool is documented as negative, no follow-up plan required
Detail Level: Detailed
Quality 317: Preventative Care And Screening: Screening For High Blood Pressure And Follow-Up Documented: Patient refuses to participate (either BP measurement or follow-up).
Quality 111:Pneumonia Vaccination Status For Older Adults: Pneumococcal Vaccination not Administered or Previously Received, Reason not Otherwise Specified
Quality 128: Preventive Care And Screening: Body Mass Index (Bmi) Screening And Follow-Up Plan: BMI not documented, reason not otherwise specified.
Quality 134: Screening For Clinical Depression And Follow-Up Plan: The patient was screened for depression and the screen was negative and no follow up required
Quality 155: Falls Plan Of Care: Plan of Care not Documented, Reason not Otherwise Specified

## 2017-11-07 NOTE — PROCEDURE: LIQUID NITROGEN
Detail Level: Detailed
Number Of Freeze-Thaw Cycles: 1 freeze-thaw cycle
Consent: The patient's consent was obtained including but not limited to risks of crusting, scabbing, blistering, scarring, darker or lighter pigmentary change, recurrence, incomplete removal and infection.
Render Post-Care Instructions In Note?: yes
Duration Of Freeze Thaw-Cycle (Seconds): 4
Post-Care Instructions: I reviewed with the patient in detail post-care instructions. Patient is to wear sunprotection, and avoid picking at any of the treated lesions. Pt may apply Vaseline to crusted or scabbing areas.

## 2018-05-09 ENCOUNTER — APPOINTMENT (OUTPATIENT)
Age: 73
Setting detail: DERMATOLOGY
End: 2018-05-11

## 2018-05-09 DIAGNOSIS — L57.0 ACTINIC KERATOSIS: ICD-10-CM

## 2018-05-09 DIAGNOSIS — Z71.89 OTHER SPECIFIED COUNSELING: ICD-10-CM

## 2018-05-09 DIAGNOSIS — L82.1 OTHER SEBORRHEIC KERATOSIS: ICD-10-CM

## 2018-05-09 DIAGNOSIS — L57.8 OTHER SKIN CHANGES DUE TO CHRONIC EXPOSURE TO NONIONIZING RADIATION: ICD-10-CM

## 2018-05-09 DIAGNOSIS — D22 MELANOCYTIC NEVI: ICD-10-CM

## 2018-05-09 DIAGNOSIS — Z85.828 PERSONAL HISTORY OF OTHER MALIGNANT NEOPLASM OF SKIN: ICD-10-CM

## 2018-05-09 PROBLEM — D22.4 MELANOCYTIC NEVI OF SCALP AND NECK: Status: ACTIVE | Noted: 2018-05-09

## 2018-05-09 PROBLEM — D22.5 MELANOCYTIC NEVI OF TRUNK: Status: ACTIVE | Noted: 2018-05-09

## 2018-05-09 PROCEDURE — OTHER OBSERVATION: OTHER

## 2018-05-09 PROCEDURE — OTHER LIQUID NITROGEN: OTHER

## 2018-05-09 PROCEDURE — 17003 DESTRUCT PREMALG LES 2-14: CPT

## 2018-05-09 PROCEDURE — OTHER MIPS QUALITY: OTHER

## 2018-05-09 PROCEDURE — 17000 DESTRUCT PREMALG LESION: CPT

## 2018-05-09 PROCEDURE — 99213 OFFICE O/P EST LOW 20 MIN: CPT | Mod: 25

## 2018-05-09 PROCEDURE — OTHER REASSURANCE: OTHER

## 2018-05-09 PROCEDURE — OTHER COUNSELING: OTHER

## 2018-05-09 ASSESSMENT — LOCATION ZONE DERM
LOCATION ZONE: LIP
LOCATION ZONE: NECK
LOCATION ZONE: ARM
LOCATION ZONE: EAR
LOCATION ZONE: FACE
LOCATION ZONE: TRUNK

## 2018-05-09 ASSESSMENT — LOCATION DETAILED DESCRIPTION DERM
LOCATION DETAILED: LEFT DISTAL DORSAL FOREARM
LOCATION DETAILED: RIGHT DISTAL DORSAL FOREARM
LOCATION DETAILED: LEFT INFERIOR VERMILION LIP
LOCATION DETAILED: RIGHT LATERAL FOREHEAD
LOCATION DETAILED: LEFT INFERIOR LATERAL FOREHEAD
LOCATION DETAILED: LEFT SUPERIOR LATERAL MALAR CHEEK
LOCATION DETAILED: RIGHT CLAVICULAR NECK
LOCATION DETAILED: RIGHT INFERIOR VERMILION LIP
LOCATION DETAILED: EPIGASTRIC SKIN
LOCATION DETAILED: RIGHT SUPERIOR HELIX

## 2018-05-09 ASSESSMENT — LOCATION SIMPLE DESCRIPTION DERM
LOCATION SIMPLE: RIGHT FOREARM
LOCATION SIMPLE: LEFT FOREHEAD
LOCATION SIMPLE: LEFT LIP
LOCATION SIMPLE: LEFT FOREARM
LOCATION SIMPLE: RIGHT ANTERIOR NECK
LOCATION SIMPLE: RIGHT FOREHEAD
LOCATION SIMPLE: RIGHT EAR
LOCATION SIMPLE: RIGHT LIP
LOCATION SIMPLE: LEFT CHEEK
LOCATION SIMPLE: ABDOMEN

## 2018-05-09 NOTE — PROCEDURE: MIPS QUALITY
Quality 131: Pain Assessment And Follow-Up: Pain assessment using a standardized tool is documented as negative, no follow-up plan required
Quality 226: Preventive Care And Screening: Tobacco Use: Screening And Cessation Intervention: Patient screened for tobacco and is an ex-smoker
Quality 265: Biopsy Follow-Up: Biopsy results reviewed, communicated, tracked, and documented
Quality 154 Part B: Falls: Risk Screening (Should Be Reported With Measure 155.): Patient screened for future fall risk; documentation of no falls in the past year or only one fall without injury in the past year
Quality 111:Pneumonia Vaccination Status For Older Adults: Pneumococcal Vaccination not Administered or Previously Received, Reason not Otherwise Specified
Detail Level: Detailed
Quality 47: Advance Care Plan: Advance Care Planning discussed and documented in the medical record; patient did not wish or was not able to name a surrogate decision maker or provide an advance care plan.
Quality 110: Preventive Care And Screening: Influenza Immunization: Influenza Immunization Administered during Influenza season
Quality 154 Part A: Falls: Risk Assessment (Should Be Reported With Measure 155.): Falls risk assessment completed and documented in the past 12 months.
Quality 317: Preventative Care And Screening: Screening For High Blood Pressure And Follow-Up Documented: Patient refuses to participate (either BP measurement or follow-up).
Quality 155: Falls Plan Of Care: Plan of Care not Documented, Reason not Otherwise Specified
Quality 128: Preventive Care And Screening: Body Mass Index (Bmi) Screening And Follow-Up Plan: BMI not documented, reason not otherwise specified.
Quality 431: Preventive Care And Screening: Unhealthy Alcohol Use - Screening: Patient screened for unhealthy alcohol use using a single question and scores less than 2 times per year
Quality 134: Screening For Clinical Depression And Follow-Up Plan: The patient was screened for depression and the screen was negative and no follow up required
Quality 130: Documentation Of Current Medications In The Medical Record: Current Medications Documented

## 2018-05-09 NOTE — PROCEDURE: LIQUID NITROGEN
Post-Care Instructions: I reviewed with the patient in detail post-care instructions. Patient is to wear sunprotection, and avoid picking at any of the treated lesions. Pt may apply Vaseline to crusted or scabbing areas.
Consent: The patient's consent was obtained including but not limited to risks of crusting, scabbing, blistering, scarring, darker or lighter pigmentary change, recurrence, incomplete removal and infection.
Number Of Freeze-Thaw Cycles: 1 freeze-thaw cycle
Render Post-Care Instructions In Note?: yes
Duration Of Freeze Thaw-Cycle (Seconds): 4
Detail Level: Detailed

## 2018-09-26 ENCOUNTER — APPOINTMENT (OUTPATIENT)
Age: 73
Setting detail: DERMATOLOGY
End: 2018-09-28

## 2018-09-26 DIAGNOSIS — L57.0 ACTINIC KERATOSIS: ICD-10-CM

## 2018-09-26 DIAGNOSIS — D22 MELANOCYTIC NEVI: ICD-10-CM

## 2018-09-26 DIAGNOSIS — Z71.89 OTHER SPECIFIED COUNSELING: ICD-10-CM

## 2018-09-26 DIAGNOSIS — L82.1 OTHER SEBORRHEIC KERATOSIS: ICD-10-CM

## 2018-09-26 DIAGNOSIS — Z85.828 PERSONAL HISTORY OF OTHER MALIGNANT NEOPLASM OF SKIN: ICD-10-CM

## 2018-09-26 DIAGNOSIS — L57.8 OTHER SKIN CHANGES DUE TO CHRONIC EXPOSURE TO NONIONIZING RADIATION: ICD-10-CM

## 2018-09-26 PROBLEM — D22.5 MELANOCYTIC NEVI OF TRUNK: Status: ACTIVE | Noted: 2018-09-26

## 2018-09-26 PROBLEM — D22.4 MELANOCYTIC NEVI OF SCALP AND NECK: Status: ACTIVE | Noted: 2018-09-26

## 2018-09-26 PROCEDURE — 17000 DESTRUCT PREMALG LESION: CPT

## 2018-09-26 PROCEDURE — 99213 OFFICE O/P EST LOW 20 MIN: CPT | Mod: 25

## 2018-09-26 PROCEDURE — 17003 DESTRUCT PREMALG LES 2-14: CPT

## 2018-09-26 PROCEDURE — OTHER OBSERVATION: OTHER

## 2018-09-26 PROCEDURE — OTHER COUNSELING: OTHER

## 2018-09-26 PROCEDURE — OTHER MIPS QUALITY: OTHER

## 2018-09-26 PROCEDURE — OTHER REASSURANCE: OTHER

## 2018-09-26 PROCEDURE — OTHER LIQUID NITROGEN: OTHER

## 2018-09-26 ASSESSMENT — LOCATION DETAILED DESCRIPTION DERM
LOCATION DETAILED: LEFT INFERIOR LATERAL FOREHEAD
LOCATION DETAILED: RIGHT INFERIOR VERMILION LIP
LOCATION DETAILED: LEFT DISTAL DORSAL FOREARM
LOCATION DETAILED: EPIGASTRIC SKIN
LOCATION DETAILED: RIGHT CLAVICULAR NECK
LOCATION DETAILED: RIGHT LATERAL FOREHEAD
LOCATION DETAILED: LEFT INFERIOR VERMILION LIP
LOCATION DETAILED: RIGHT RADIAL DORSAL HAND
LOCATION DETAILED: RIGHT DISTAL DORSAL FOREARM
LOCATION DETAILED: LEFT SUPERIOR LATERAL MALAR CHEEK
LOCATION DETAILED: LEFT RADIAL DORSAL HAND

## 2018-09-26 ASSESSMENT — LOCATION SIMPLE DESCRIPTION DERM
LOCATION SIMPLE: LEFT FOREHEAD
LOCATION SIMPLE: LEFT LIP
LOCATION SIMPLE: LEFT CHEEK
LOCATION SIMPLE: LEFT HAND
LOCATION SIMPLE: RIGHT FOREHEAD
LOCATION SIMPLE: RIGHT ANTERIOR NECK
LOCATION SIMPLE: RIGHT LIP
LOCATION SIMPLE: RIGHT HAND
LOCATION SIMPLE: LEFT FOREARM
LOCATION SIMPLE: RIGHT FOREARM
LOCATION SIMPLE: ABDOMEN

## 2018-09-26 ASSESSMENT — LOCATION ZONE DERM
LOCATION ZONE: HAND
LOCATION ZONE: NECK
LOCATION ZONE: ARM
LOCATION ZONE: LIP
LOCATION ZONE: TRUNK
LOCATION ZONE: FACE

## 2018-09-26 NOTE — PROCEDURE: MIPS QUALITY
Quality 317: Preventative Care And Screening: Screening For High Blood Pressure And Follow-Up Documented: Patient refuses to participate (either BP measurement or follow-up).
Quality 130: Documentation Of Current Medications In The Medical Record: Current Medications Documented
Quality 131: Pain Assessment And Follow-Up: Pain assessment using a standardized tool is documented as negative, no follow-up plan required
Quality 128: Preventive Care And Screening: Body Mass Index (Bmi) Screening And Follow-Up Plan: BMI not documented, reason not otherwise specified.
Detail Level: Detailed
Quality 265: Biopsy Follow-Up: Biopsy results reviewed, communicated, tracked, and documented
Quality 154 Part A: Falls: Risk Assessment (Should Be Reported With Measure 155.): Falls risk assessment completed and documented in the past 12 months.
Quality 134: Screening For Clinical Depression And Follow-Up Plan: The patient was screened for depression and the screen was negative and no follow up required
Quality 154 Part B: Falls: Risk Screening (Should Be Reported With Measure 155.): Patient screened for future fall risk; documentation of no falls in the past year or only one fall without injury in the past year
Quality 110: Preventive Care And Screening: Influenza Immunization: Influenza Immunization Administered during Influenza season
Quality 47: Advance Care Plan: Advance Care Planning discussed and documented in the medical record; patient did not wish or was not able to name a surrogate decision maker or provide an advance care plan.
Quality 111:Pneumonia Vaccination Status For Older Adults: Pneumococcal Vaccination not Administered or Previously Received, Reason not Otherwise Specified
Quality 155: Falls Plan Of Care: Plan of Care not Documented, Reason not Otherwise Specified
Quality 226: Preventive Care And Screening: Tobacco Use: Screening And Cessation Intervention: Patient screened for tobacco and is an ex-smoker
Quality 431: Preventive Care And Screening: Unhealthy Alcohol Use - Screening: Patient screened for unhealthy alcohol use using a single question and scores less than 2 times per year

## 2018-09-26 NOTE — PROCEDURE: LIQUID NITROGEN
Consent: The patient's consent was obtained including but not limited to risks of crusting, scabbing, blistering, scarring, darker or lighter pigmentary change, recurrence, incomplete removal and infection.
Detail Level: Detailed
Post-Care Instructions: I reviewed with the patient in detail post-care instructions. Patient is to wear sunprotection, and avoid picking at any of the treated lesions. Pt may apply Vaseline to crusted or scabbing areas.
Duration Of Freeze Thaw-Cycle (Seconds): 4
Render Post-Care Instructions In Note?: yes
Number Of Freeze-Thaw Cycles: 1 freeze-thaw cycle

## 2019-09-24 PROBLEM — Z12.11 SCREEN FOR COLON CANCER: Status: RESOLVED | Noted: 2017-10-05 | Resolved: 2019-09-24

## (undated) DEVICE — GOWN,PREVENTION PLUS,XL,ST,24/CS: Brand: MEDLINE

## (undated) DEVICE — 1200 GUARD II KIT W/5MM TUBE W/O VAC TUBE: Brand: GUARDIAN

## (undated) DEVICE — Device

## (undated) DEVICE — (D)SYR 10ML 1/5ML GRAD NSAF -- PKGING CHANGE USE ITEM 338027

## (undated) DEVICE — SYR 3ML LL TIP 1/10ML GRAD --

## (undated) DEVICE — TOWEL 4 PLY TISS 19X30 SUE WHT

## (undated) DEVICE — SUTURE SZ 0 27IN 5/8 CIR UR-6  TAPER PT VIOLET ABSRB VICRYL J603H

## (undated) DEVICE — Z CONVERTED USE 2107985 COVER FLROSCP W36XL28IN 4 SIDE ADH

## (undated) DEVICE — Device: Brand: MEDEX

## (undated) DEVICE — DERMABOND SKIN ADH 0.7ML -- DERMABOND ADVANCED 12/BX

## (undated) DEVICE — KENDALL SCD EXPRESS SLEEVES, KNEE LENGTH, MEDIUM: Brand: KENDALL SCD

## (undated) DEVICE — SET ADMIN 16ML TBNG L100IN 2 Y INJ SITE IV PIGGY BK DISP

## (undated) DEVICE — NEONATAL-ADULT SPO2 SENSOR: Brand: NELLCOR

## (undated) DEVICE — Z DISCONTINUED PER MEDLINE LINE GAS SAMPLING O2/CO2 LNG AD 13 FT NSL W/ TBNG FILTERLINE

## (undated) DEVICE — INFECTION CONTROL KIT SYS

## (undated) DEVICE — BLUNT TROCAR WITH THREADED ANCHOR: Brand: VERSAONE

## (undated) DEVICE — KENDALL RADIOLUCENT FOAM MONITORING ELECTRODE RECTANGULAR SHAPE: Brand: KENDALL

## (undated) DEVICE — SOLUTION IRRIGATION NACL 0.9% 1000 ML FLX CONTAINER

## (undated) DEVICE — SOLIDIFIER MEDC 1200ML -- CONVERT TO 356117

## (undated) DEVICE — SYR LR LCK 1ML GRAD NSAF 30ML --

## (undated) DEVICE — SUTURE VCRL SZ 4-0 L27IN ABSRB UD L19MM PS-2 3/8 CIR PRIM J426H

## (undated) DEVICE — TRAP SUC MUCOUS 70ML -- MEDICHOICE MEDLINE

## (undated) DEVICE — CATH IV AUTOGRD BC BLU 22GA 25 -- INSYTE

## (undated) DEVICE — CATHETER URET 4FR L70CM POLYUR OLV FLX TIP KINK RESIST W/

## (undated) DEVICE — SET EXTN TBNG L BOR 4 W STPCOCK ST 32IN PRIMING VOL 6ML

## (undated) DEVICE — SURGICAL PROCEDURE KIT GEN LAPAROSCOPY LF

## (undated) DEVICE — DUAL LUMEN STOMACH TUBE MULTI-FUNCTIONAL PORT: Brand: SALEM SUMP

## (undated) DEVICE — (D)PREP SKN CHLRAPRP APPL 26ML -- CONVERT TO ITEM 371833

## (undated) DEVICE — BASIN EMESIS 500CC ROSE 250/CS 60/PLT: Brand: MEDEGEN MEDICAL PRODUCTS, LLC

## (undated) DEVICE — STRAINER URIN CALC RNL MSH -- CONVERT TO ITEM 357634

## (undated) DEVICE — STERILE POLYISOPRENE POWDER-FREE SURGICAL GLOVES: Brand: PROTEXIS

## (undated) DEVICE — REM POLYHESIVE ADULT PATIENT RETURN ELECTRODE: Brand: VALLEYLAB

## (undated) DEVICE — DEVON™ KNEE AND BODY STRAP 60" X 3" (1.5 M X 7.6 CM): Brand: DEVON

## (undated) DEVICE — SNARE ENDOSCP M L240CM W27MM SHTH DIA2.4MM CHN 2.8MM OVL

## (undated) DEVICE — HANDLE LT SNAP ON ULT DURABLE LENS FOR TRUMPF ALC DISPOSABLE

## (undated) DEVICE — NEEDLE HYPO 18GA L1.5IN PNK S STL HUB POLYPR SHLD REG BVL

## (undated) DEVICE — APPLIER CLP M/L SHFT DIA5MM 15 LIG LIGAMAX 5

## (undated) DEVICE — CONTAINER SPEC 20 ML LID NEUT BUFF FORMALIN 10 % POLYPR STS

## (undated) DEVICE — NEEDLE HYPO 25GA L1.5IN BVL ORIENTED ECLIPSE

## (undated) DEVICE — MAX-CORE® DISPOSABLE CORE BIOPSY INSTRUMENT, 18G X 20CM: Brand: MAX-CORE